# Patient Record
Sex: FEMALE | Race: WHITE | NOT HISPANIC OR LATINO | Employment: FULL TIME | URBAN - METROPOLITAN AREA
[De-identification: names, ages, dates, MRNs, and addresses within clinical notes are randomized per-mention and may not be internally consistent; named-entity substitution may affect disease eponyms.]

---

## 2019-07-17 ENCOUNTER — ANESTHESIA (OUTPATIENT)
Dept: SURGERY | Facility: HOSPITAL | Age: 39
DRG: 331 | End: 2019-07-17
Payer: COMMERCIAL

## 2019-07-17 ENCOUNTER — ANESTHESIA EVENT (OUTPATIENT)
Dept: SURGERY | Facility: HOSPITAL | Age: 39
DRG: 331 | End: 2019-07-17
Payer: COMMERCIAL

## 2019-07-17 ENCOUNTER — HOSPITAL ENCOUNTER (INPATIENT)
Facility: HOSPITAL | Age: 39
LOS: 6 days | Discharge: HOME | DRG: 331 | End: 2019-07-23
Attending: EMERGENCY MEDICINE | Admitting: COLON & RECTAL SURGERY
Payer: COMMERCIAL

## 2019-07-17 DIAGNOSIS — K45.8 INTERNAL HERNIA: ICD-10-CM

## 2019-07-17 DIAGNOSIS — K56.609 SMALL BOWEL OBSTRUCTION: Primary | ICD-10-CM

## 2019-07-17 DIAGNOSIS — Z01.89 ENCOUNTER FOR IMAGING STUDY TO CONFIRM NASOGASTRIC (NG) TUBE PLACEMENT: ICD-10-CM

## 2019-07-17 DIAGNOSIS — K56.2 SMALL BOWEL VOLVULUS: ICD-10-CM

## 2019-07-17 LAB
ALBUMIN SERPL BCP-MCNC: 4.1 G/DL (ref 3.5–5.2)
ALP SERPL-CCNC: 83 U/L (ref 55–135)
ALT SERPL W/O P-5'-P-CCNC: 13 U/L (ref 10–44)
ANION GAP SERPL CALC-SCNC: 11 MMOL/L (ref 8–16)
ANISOCYTOSIS BLD QL SMEAR: SLIGHT
AST SERPL-CCNC: 23 U/L (ref 10–40)
B-HCG UR QL: NEGATIVE
BACTERIA #/AREA URNS HPF: ABNORMAL /HPF
BASOPHILS # BLD AUTO: 0.02 K/UL (ref 0–0.2)
BASOPHILS NFR BLD: 0.2 % (ref 0–1.9)
BILIRUB SERPL-MCNC: 0.6 MG/DL (ref 0.1–1)
BILIRUB UR QL STRIP: NEGATIVE
BUN SERPL-MCNC: 12 MG/DL (ref 6–20)
CALCIUM SERPL-MCNC: 9.7 MG/DL (ref 8.7–10.5)
CHLORIDE SERPL-SCNC: 105 MMOL/L (ref 95–110)
CLARITY UR: ABNORMAL
CO2 SERPL-SCNC: 23 MMOL/L (ref 23–29)
COLOR UR: YELLOW
CREAT SERPL-MCNC: 0.8 MG/DL (ref 0.5–1.4)
DIFFERENTIAL METHOD: ABNORMAL
EOSINOPHIL # BLD AUTO: 0.1 K/UL (ref 0–0.5)
EOSINOPHIL NFR BLD: 1.3 % (ref 0–8)
ERYTHROCYTE [DISTWIDTH] IN BLOOD BY AUTOMATED COUNT: 16.6 % (ref 11.5–14.5)
EST. GFR  (AFRICAN AMERICAN): >60 ML/MIN/1.73 M^2
EST. GFR  (NON AFRICAN AMERICAN): >60 ML/MIN/1.73 M^2
GLUCOSE SERPL-MCNC: 87 MG/DL (ref 70–110)
GLUCOSE UR QL STRIP: NEGATIVE
HCT VFR BLD AUTO: 28.7 % (ref 37–48.5)
HGB BLD-MCNC: 8.4 G/DL (ref 12–16)
HGB UR QL STRIP: NEGATIVE
HIV 1+2 AB+HIV1 P24 AG SERPL QL IA: NEGATIVE
HYPOCHROMIA BLD QL SMEAR: ABNORMAL
KETONES UR QL STRIP: NEGATIVE
LEUKOCYTE ESTERASE UR QL STRIP: NEGATIVE
LIPASE SERPL-CCNC: 27 U/L (ref 4–60)
LYMPHOCYTES # BLD AUTO: 2.2 K/UL (ref 1–4.8)
LYMPHOCYTES NFR BLD: 23.5 % (ref 18–48)
MCH RBC QN AUTO: 19.8 PG (ref 27–31)
MCHC RBC AUTO-ENTMCNC: 29.3 G/DL (ref 32–36)
MCV RBC AUTO: 68 FL (ref 82–98)
MICROSCOPIC COMMENT: ABNORMAL
MONOCYTES # BLD AUTO: 0.7 K/UL (ref 0.3–1)
MONOCYTES NFR BLD: 7.3 % (ref 4–15)
NEUTROPHILS # BLD AUTO: 6.4 K/UL (ref 1.8–7.7)
NEUTROPHILS NFR BLD: 68.1 % (ref 38–73)
NITRITE UR QL STRIP: POSITIVE
PH UR STRIP: 6 [PH] (ref 5–8)
PLATELET # BLD AUTO: 356 K/UL (ref 150–350)
PLATELET BLD QL SMEAR: ABNORMAL
PMV BLD AUTO: 8.8 FL (ref 9.2–12.9)
POIKILOCYTOSIS BLD QL SMEAR: SLIGHT
POTASSIUM SERPL-SCNC: 4 MMOL/L (ref 3.5–5.1)
PROT SERPL-MCNC: 7.3 G/DL (ref 6–8.4)
PROT UR QL STRIP: ABNORMAL
RBC # BLD AUTO: 4.24 M/UL (ref 4–5.4)
RBC #/AREA URNS HPF: 5 /HPF (ref 0–4)
SODIUM SERPL-SCNC: 139 MMOL/L (ref 136–145)
SP GR UR STRIP: >=1.03 (ref 1–1.03)
SPHEROCYTES BLD QL SMEAR: ABNORMAL
SQUAMOUS #/AREA URNS HPF: 10 /HPF
STOMATOCYTES BLD QL SMEAR: PRESENT
TARGETS BLD QL SMEAR: ABNORMAL
URN SPEC COLLECT METH UR: ABNORMAL
UROBILINOGEN UR STRIP-ACNC: 1 EU/DL
WBC # BLD AUTO: 9.44 K/UL (ref 3.9–12.7)
WBC #/AREA URNS HPF: 25 /HPF (ref 0–5)

## 2019-07-17 PROCEDURE — 83690 ASSAY OF LIPASE: CPT

## 2019-07-17 PROCEDURE — 44050 PR REDUCE VOLVULUS,INTUSS,INTERN HERNIA: ICD-10-PCS | Mod: ,,, | Performed by: COLON & RECTAL SURGERY

## 2019-07-17 PROCEDURE — 99900035 HC TECH TIME PER 15 MIN (STAT)

## 2019-07-17 PROCEDURE — 81000 URINALYSIS NONAUTO W/SCOPE: CPT

## 2019-07-17 PROCEDURE — 81025 URINE PREGNANCY TEST: CPT

## 2019-07-17 PROCEDURE — S0028 INJECTION, FAMOTIDINE, 20 MG: HCPCS | Performed by: COLON & RECTAL SURGERY

## 2019-07-17 PROCEDURE — 87077 CULTURE AEROBIC IDENTIFY: CPT

## 2019-07-17 PROCEDURE — 96361 HYDRATE IV INFUSION ADD-ON: CPT

## 2019-07-17 PROCEDURE — 25000003 PHARM REV CODE 250: Performed by: COLON & RECTAL SURGERY

## 2019-07-17 PROCEDURE — 86703 HIV-1/HIV-2 1 RESULT ANTBDY: CPT

## 2019-07-17 PROCEDURE — 37000009 HC ANESTHESIA EA ADD 15 MINS: Performed by: COLON & RECTAL SURGERY

## 2019-07-17 PROCEDURE — 99285 EMERGENCY DEPT VISIT HI MDM: CPT | Mod: 25

## 2019-07-17 PROCEDURE — 71000033 HC RECOVERY, INTIAL HOUR: Performed by: COLON & RECTAL SURGERY

## 2019-07-17 PROCEDURE — 99222 1ST HOSP IP/OBS MODERATE 55: CPT | Mod: 57,,, | Performed by: PHYSICIAN ASSISTANT

## 2019-07-17 PROCEDURE — 44050 REDUCE BOWEL OBSTRUCTION: CPT | Mod: ,,, | Performed by: COLON & RECTAL SURGERY

## 2019-07-17 PROCEDURE — 36415 COLL VENOUS BLD VENIPUNCTURE: CPT

## 2019-07-17 PROCEDURE — 63600175 PHARM REV CODE 636 W HCPCS: Performed by: NURSE ANESTHETIST, CERTIFIED REGISTERED

## 2019-07-17 PROCEDURE — 25000003 PHARM REV CODE 250: Performed by: NURSE ANESTHETIST, CERTIFIED REGISTERED

## 2019-07-17 PROCEDURE — 11000001 HC ACUTE MED/SURG PRIVATE ROOM

## 2019-07-17 PROCEDURE — 63600175 PHARM REV CODE 636 W HCPCS: Performed by: EMERGENCY MEDICINE

## 2019-07-17 PROCEDURE — 25500020 PHARM REV CODE 255: Performed by: EMERGENCY MEDICINE

## 2019-07-17 PROCEDURE — 25000003 PHARM REV CODE 250: Performed by: EMERGENCY MEDICINE

## 2019-07-17 PROCEDURE — 87186 SC STD MICRODIL/AGAR DIL: CPT

## 2019-07-17 PROCEDURE — 96375 TX/PRO/DX INJ NEW DRUG ADDON: CPT

## 2019-07-17 PROCEDURE — 96365 THER/PROPH/DIAG IV INF INIT: CPT

## 2019-07-17 PROCEDURE — 94770 HC EXHALED C02 TEST: CPT

## 2019-07-17 PROCEDURE — 99222 PR INITIAL HOSPITAL CARE,LEVL II: ICD-10-PCS | Mod: 57,,, | Performed by: PHYSICIAN ASSISTANT

## 2019-07-17 PROCEDURE — 63600175 PHARM REV CODE 636 W HCPCS: Performed by: COLON & RECTAL SURGERY

## 2019-07-17 PROCEDURE — 37000008 HC ANESTHESIA 1ST 15 MINUTES: Performed by: COLON & RECTAL SURGERY

## 2019-07-17 PROCEDURE — S0030 INJECTION, METRONIDAZOLE: HCPCS | Performed by: NURSE ANESTHETIST, CERTIFIED REGISTERED

## 2019-07-17 PROCEDURE — 85025 COMPLETE CBC W/AUTO DIFF WBC: CPT

## 2019-07-17 PROCEDURE — C9290 INJ, BUPIVACAINE LIPOSOME: HCPCS | Performed by: COLON & RECTAL SURGERY

## 2019-07-17 PROCEDURE — 87088 URINE BACTERIA CULTURE: CPT

## 2019-07-17 PROCEDURE — 27201423 OPTIME MED/SURG SUP & DEVICES STERILE SUPPLY: Performed by: COLON & RECTAL SURGERY

## 2019-07-17 PROCEDURE — 80053 COMPREHEN METABOLIC PANEL: CPT

## 2019-07-17 PROCEDURE — 36000706: Performed by: COLON & RECTAL SURGERY

## 2019-07-17 PROCEDURE — 87086 URINE CULTURE/COLONY COUNT: CPT

## 2019-07-17 PROCEDURE — 36000707: Performed by: COLON & RECTAL SURGERY

## 2019-07-17 RX ORDER — BUSPIRONE HYDROCHLORIDE 5 MG/1
5 TABLET ORAL 2 TIMES DAILY
COMMUNITY

## 2019-07-17 RX ORDER — LIDOCAINE HYDROCHLORIDE 10 MG/ML
INJECTION, SOLUTION EPIDURAL; INFILTRATION; INTRACAUDAL; PERINEURAL
Status: DISCONTINUED | OUTPATIENT
Start: 2019-07-17 | End: 2019-07-17

## 2019-07-17 RX ORDER — FENTANYL CITRATE 50 UG/ML
INJECTION, SOLUTION INTRAMUSCULAR; INTRAVENOUS
Status: DISCONTINUED | OUTPATIENT
Start: 2019-07-17 | End: 2019-07-17

## 2019-07-17 RX ORDER — KETOROLAC TROMETHAMINE 30 MG/ML
15 INJECTION, SOLUTION INTRAMUSCULAR; INTRAVENOUS EVERY 8 HOURS PRN
Status: DISCONTINUED | OUTPATIENT
Start: 2019-07-17 | End: 2019-07-17

## 2019-07-17 RX ORDER — MORPHINE SULFATE 4 MG/ML
4 INJECTION, SOLUTION INTRAMUSCULAR; INTRAVENOUS
Status: DISCONTINUED | OUTPATIENT
Start: 2019-07-17 | End: 2019-07-17

## 2019-07-17 RX ORDER — ACETAMINOPHEN 10 MG/ML
1000 INJECTION, SOLUTION INTRAVENOUS EVERY 8 HOURS
Status: COMPLETED | OUTPATIENT
Start: 2019-07-17 | End: 2019-07-18

## 2019-07-17 RX ORDER — OXYCODONE HYDROCHLORIDE 5 MG/1
5 TABLET ORAL
Status: DISCONTINUED | OUTPATIENT
Start: 2019-07-17 | End: 2019-07-17

## 2019-07-17 RX ORDER — ONDANSETRON 2 MG/ML
INJECTION INTRAMUSCULAR; INTRAVENOUS
Status: DISCONTINUED | OUTPATIENT
Start: 2019-07-17 | End: 2019-07-17

## 2019-07-17 RX ORDER — ROCURONIUM BROMIDE 10 MG/ML
INJECTION, SOLUTION INTRAVENOUS
Status: DISCONTINUED | OUTPATIENT
Start: 2019-07-17 | End: 2019-07-17

## 2019-07-17 RX ORDER — MORPHINE SULFATE 4 MG/ML
4 INJECTION, SOLUTION INTRAMUSCULAR; INTRAVENOUS
Status: COMPLETED | OUTPATIENT
Start: 2019-07-17 | End: 2019-07-17

## 2019-07-17 RX ORDER — SODIUM CHLORIDE 0.9 % (FLUSH) 0.9 %
10 SYRINGE (ML) INJECTION
Status: DISCONTINUED | OUTPATIENT
Start: 2019-07-17 | End: 2019-07-23 | Stop reason: HOSPADM

## 2019-07-17 RX ORDER — FAMOTIDINE 20 MG/50ML
20 INJECTION, SOLUTION INTRAVENOUS 2 TIMES DAILY
Status: DISCONTINUED | OUTPATIENT
Start: 2019-07-17 | End: 2019-07-22

## 2019-07-17 RX ORDER — PANTOPRAZOLE SODIUM 40 MG/10ML
40 INJECTION, POWDER, LYOPHILIZED, FOR SOLUTION INTRAVENOUS
Status: DISCONTINUED | OUTPATIENT
Start: 2019-07-18 | End: 2019-07-17

## 2019-07-17 RX ORDER — MORPHINE SULFATE 1 MG/ML
INJECTION INTRAVENOUS CONTINUOUS
Status: DISCONTINUED | OUTPATIENT
Start: 2019-07-17 | End: 2019-07-20

## 2019-07-17 RX ORDER — SODIUM CHLORIDE, SODIUM LACTATE, POTASSIUM CHLORIDE, CALCIUM CHLORIDE 600; 310; 30; 20 MG/100ML; MG/100ML; MG/100ML; MG/100ML
INJECTION, SOLUTION INTRAVENOUS CONTINUOUS PRN
Status: DISCONTINUED | OUTPATIENT
Start: 2019-07-17 | End: 2019-07-17

## 2019-07-17 RX ORDER — HYDROMORPHONE HYDROCHLORIDE 2 MG/ML
0.2 INJECTION, SOLUTION INTRAMUSCULAR; INTRAVENOUS; SUBCUTANEOUS EVERY 5 MIN PRN
Status: DISCONTINUED | OUTPATIENT
Start: 2019-07-17 | End: 2019-07-17

## 2019-07-17 RX ORDER — METRONIDAZOLE 500 MG/100ML
INJECTION, SOLUTION INTRAVENOUS
Status: DISCONTINUED | OUTPATIENT
Start: 2019-07-17 | End: 2019-07-17

## 2019-07-17 RX ORDER — SODIUM CHLORIDE 9 MG/ML
1000 INJECTION, SOLUTION INTRAVENOUS
Status: DISCONTINUED | OUTPATIENT
Start: 2019-07-17 | End: 2019-07-17

## 2019-07-17 RX ORDER — SODIUM CHLORIDE 9 MG/ML
1000 INJECTION, SOLUTION INTRAVENOUS
Status: COMPLETED | OUTPATIENT
Start: 2019-07-17 | End: 2019-07-17

## 2019-07-17 RX ORDER — GLYCOPYRROLATE 0.2 MG/ML
INJECTION INTRAMUSCULAR; INTRAVENOUS
Status: DISCONTINUED | OUTPATIENT
Start: 2019-07-17 | End: 2019-07-17

## 2019-07-17 RX ORDER — ONDANSETRON 2 MG/ML
4 INJECTION INTRAMUSCULAR; INTRAVENOUS EVERY 6 HOURS PRN
Status: DISCONTINUED | OUTPATIENT
Start: 2019-07-17 | End: 2019-07-17

## 2019-07-17 RX ORDER — ONDANSETRON 2 MG/ML
4 INJECTION INTRAMUSCULAR; INTRAVENOUS
Status: DISCONTINUED | OUTPATIENT
Start: 2019-07-17 | End: 2019-07-17

## 2019-07-17 RX ORDER — DIPHENHYDRAMINE HYDROCHLORIDE 50 MG/ML
12.5 INJECTION INTRAMUSCULAR; INTRAVENOUS EVERY 6 HOURS PRN
Status: DISCONTINUED | OUTPATIENT
Start: 2019-07-17 | End: 2019-07-23 | Stop reason: HOSPADM

## 2019-07-17 RX ORDER — MORPHINE SULFATE 4 MG/ML
4 INJECTION, SOLUTION INTRAMUSCULAR; INTRAVENOUS EVERY 4 HOURS PRN
Status: DISCONTINUED | OUTPATIENT
Start: 2019-07-17 | End: 2019-07-20

## 2019-07-17 RX ORDER — SUCCINYLCHOLINE CHLORIDE 20 MG/ML
INJECTION INTRAMUSCULAR; INTRAVENOUS
Status: DISCONTINUED | OUTPATIENT
Start: 2019-07-17 | End: 2019-07-17

## 2019-07-17 RX ORDER — KETOROLAC TROMETHAMINE 30 MG/ML
INJECTION, SOLUTION INTRAMUSCULAR; INTRAVENOUS
Status: DISCONTINUED | OUTPATIENT
Start: 2019-07-17 | End: 2019-07-17

## 2019-07-17 RX ORDER — BUPIVACAINE HYDROCHLORIDE 2.5 MG/ML
INJECTION, SOLUTION EPIDURAL; INFILTRATION; INTRACAUDAL
Status: DISCONTINUED | OUTPATIENT
Start: 2019-07-17 | End: 2019-07-17 | Stop reason: HOSPADM

## 2019-07-17 RX ORDER — SODIUM CHLORIDE, SODIUM LACTATE, POTASSIUM CHLORIDE, CALCIUM CHLORIDE 600; 310; 30; 20 MG/100ML; MG/100ML; MG/100ML; MG/100ML
INJECTION, SOLUTION INTRAVENOUS CONTINUOUS
Status: DISCONTINUED | OUTPATIENT
Start: 2019-07-17 | End: 2019-07-20

## 2019-07-17 RX ORDER — PROPOFOL 10 MG/ML
VIAL (ML) INTRAVENOUS
Status: DISCONTINUED | OUTPATIENT
Start: 2019-07-17 | End: 2019-07-17

## 2019-07-17 RX ORDER — METRONIDAZOLE 500 MG/100ML
INJECTION, SOLUTION INTRAVENOUS
Status: COMPLETED
Start: 2019-07-17 | End: 2019-07-17

## 2019-07-17 RX ORDER — CHLORHEXIDINE GLUCONATE ORAL RINSE 1.2 MG/ML
10 SOLUTION DENTAL 2 TIMES DAILY
Status: COMPLETED | OUTPATIENT
Start: 2019-07-17 | End: 2019-07-22

## 2019-07-17 RX ORDER — ENOXAPARIN SODIUM 100 MG/ML
40 INJECTION SUBCUTANEOUS EVERY 24 HOURS
Status: DISCONTINUED | OUTPATIENT
Start: 2019-07-18 | End: 2019-07-23 | Stop reason: HOSPADM

## 2019-07-17 RX ORDER — NEOSTIGMINE METHYLSULFATE 1 MG/ML
INJECTION, SOLUTION INTRAVENOUS
Status: DISCONTINUED | OUTPATIENT
Start: 2019-07-17 | End: 2019-07-17

## 2019-07-17 RX ORDER — ONDANSETRON 2 MG/ML
4 INJECTION INTRAMUSCULAR; INTRAVENOUS EVERY 12 HOURS PRN
Status: DISCONTINUED | OUTPATIENT
Start: 2019-07-17 | End: 2019-07-23 | Stop reason: HOSPADM

## 2019-07-17 RX ORDER — ONDANSETRON 2 MG/ML
4 INJECTION INTRAMUSCULAR; INTRAVENOUS
Status: COMPLETED | OUTPATIENT
Start: 2019-07-17 | End: 2019-07-17

## 2019-07-17 RX ORDER — DEXAMETHASONE SODIUM PHOSPHATE 4 MG/ML
INJECTION, SOLUTION INTRA-ARTICULAR; INTRALESIONAL; INTRAMUSCULAR; INTRAVENOUS; SOFT TISSUE
Status: DISCONTINUED | OUTPATIENT
Start: 2019-07-17 | End: 2019-07-17

## 2019-07-17 RX ORDER — NALOXONE HCL 0.4 MG/ML
0.02 VIAL (ML) INJECTION
Status: DISCONTINUED | OUTPATIENT
Start: 2019-07-17 | End: 2019-07-23 | Stop reason: HOSPADM

## 2019-07-17 RX ADMIN — MORPHINE SULFATE: 1 INJECTION INTRAVENOUS at 06:07

## 2019-07-17 RX ADMIN — SODIUM CHLORIDE 1000 ML: 0.9 INJECTION, SOLUTION INTRAVENOUS at 05:07

## 2019-07-17 RX ADMIN — METRONIDAZOLE 500 MG: 500 SOLUTION INTRAVENOUS at 04:07

## 2019-07-17 RX ADMIN — IOHEXOL 75 ML: 350 INJECTION, SOLUTION INTRAVENOUS at 02:07

## 2019-07-17 RX ADMIN — FENTANYL CITRATE 100 MCG: 50 INJECTION, SOLUTION INTRAMUSCULAR; INTRAVENOUS at 04:07

## 2019-07-17 RX ADMIN — MORPHINE SULFATE 4 MG: 4 INJECTION, SOLUTION INTRAMUSCULAR; INTRAVENOUS at 05:07

## 2019-07-17 RX ADMIN — MORPHINE SULFATE 4 MG: 4 INJECTION, SOLUTION INTRAMUSCULAR; INTRAVENOUS at 01:07

## 2019-07-17 RX ADMIN — SODIUM CHLORIDE, SODIUM LACTATE, POTASSIUM CHLORIDE, AND CALCIUM CHLORIDE: .6; .31; .03; .02 INJECTION, SOLUTION INTRAVENOUS at 08:07

## 2019-07-17 RX ADMIN — ONDANSETRON 4 MG: 2 INJECTION INTRAMUSCULAR; INTRAVENOUS at 06:07

## 2019-07-17 RX ADMIN — ONDANSETRON 4 MG: 2 INJECTION INTRAMUSCULAR; INTRAVENOUS at 05:07

## 2019-07-17 RX ADMIN — ONDANSETRON 4 MG: 2 INJECTION INTRAMUSCULAR; INTRAVENOUS at 01:07

## 2019-07-17 RX ADMIN — ROBINUL 0.6 MG: 0.2 INJECTION INTRAMUSCULAR; INTRAVENOUS at 05:07

## 2019-07-17 RX ADMIN — SODIUM CHLORIDE, SODIUM LACTATE, POTASSIUM CHLORIDE, AND CALCIUM CHLORIDE: .6; .31; .03; .02 INJECTION, SOLUTION INTRAVENOUS at 03:07

## 2019-07-17 RX ADMIN — LIDOCAINE HYDROCHLORIDE 50 MG: 10 INJECTION, SOLUTION EPIDURAL; INFILTRATION; INTRACAUDAL; PERINEURAL at 04:07

## 2019-07-17 RX ADMIN — SODIUM CHLORIDE 1000 ML: 0.9 INJECTION, SOLUTION INTRAVENOUS at 01:07

## 2019-07-17 RX ADMIN — SUCCINYLCHOLINE CHLORIDE 120 MG: 20 INJECTION, SOLUTION INTRAMUSCULAR; INTRAVENOUS at 04:07

## 2019-07-17 RX ADMIN — MORPHINE SULFATE 4 MG: 4 INJECTION, SOLUTION INTRAMUSCULAR; INTRAVENOUS at 10:07

## 2019-07-17 RX ADMIN — DEXAMETHASONE SODIUM PHOSPHATE 4 MG: 4 INJECTION, SOLUTION INTRA-ARTICULAR; INTRALESIONAL; INTRAMUSCULAR; INTRAVENOUS; SOFT TISSUE at 05:07

## 2019-07-17 RX ADMIN — CEFTRIAXONE 1 G: 1 INJECTION, SOLUTION INTRAVENOUS at 03:07

## 2019-07-17 RX ADMIN — ONDANSETRON 4 MG: 2 INJECTION INTRAMUSCULAR; INTRAVENOUS at 02:07

## 2019-07-17 RX ADMIN — NEOSTIGMINE METHYLSULFATE 4 MG: 1 INJECTION INTRAVENOUS at 05:07

## 2019-07-17 RX ADMIN — ONDANSETRON 4 MG: 2 INJECTION, SOLUTION INTRAMUSCULAR; INTRAVENOUS at 05:07

## 2019-07-17 RX ADMIN — CEFTRIAXONE 2 G: 1 INJECTION, SOLUTION INTRAVENOUS at 04:07

## 2019-07-17 RX ADMIN — KETOROLAC TROMETHAMINE 30 MG: 30 INJECTION, SOLUTION INTRAMUSCULAR; INTRAVENOUS at 05:07

## 2019-07-17 RX ADMIN — CHLORHEXIDINE GLUCONATE 0.12% ORAL RINSE 10 ML: 1.2 LIQUID ORAL at 08:07

## 2019-07-17 RX ADMIN — ACETAMINOPHEN 1000 MG: 10 INJECTION, SOLUTION INTRAVENOUS at 09:07

## 2019-07-17 RX ADMIN — ROCURONIUM BROMIDE 35 MG: 10 INJECTION, SOLUTION INTRAVENOUS at 04:07

## 2019-07-17 RX ADMIN — SODIUM CHLORIDE, SODIUM LACTATE, POTASSIUM CHLORIDE, AND CALCIUM CHLORIDE: 600; 310; 30; 20 INJECTION, SOLUTION INTRAVENOUS at 04:07

## 2019-07-17 RX ADMIN — FAMOTIDINE 20 MG: 20 INJECTION, SOLUTION INTRAVENOUS at 08:07

## 2019-07-17 RX ADMIN — PROMETHAZINE HYDROCHLORIDE 6.25 MG: 25 INJECTION INTRAMUSCULAR; INTRAVENOUS at 11:07

## 2019-07-17 RX ADMIN — ROCURONIUM BROMIDE 5 MG: 10 INJECTION, SOLUTION INTRAVENOUS at 04:07

## 2019-07-17 RX ADMIN — PROPOFOL 200 MG: 10 INJECTION, EMULSION INTRAVENOUS at 04:07

## 2019-07-17 RX ADMIN — BUPIVACAINE 266 MG: 13.3 INJECTION, SUSPENSION, LIPOSOMAL INFILTRATION at 04:07

## 2019-07-17 NOTE — PLAN OF CARE
CM met with patient at the bedside to assess for discharge needs.  Patient lives at home alone and is independent with all needs.  She does not anticipate any discharge needs at this time.  CM provided a transitional care folder, information on advanced directives, information on pharmacy bedside delivery, and discharge planning begins on admission with contact information for any needs/questions.    D/C Plan: Home  PCP: None  Preferred Pharmacy: [facility] [address] [place]  Discharge transportation: Family  My [facility]: Link sent  Pharmacy Bedside Delivery: Yes     07/17/19 0282   Discharge Assessment   Assessment Type Discharge Planning Assessment   Confirmed/corrected address and phone number on facesheet? Yes   Assessment information obtained from? Patient;Medical Record   Expected Length of Stay (days)   (TBD)   Communicated expected length of stay with patient/caregiver yes   Prior to hospitilization cognitive status: Alert/Oriented   Prior to hospitalization functional status: Independent   Current cognitive status: Alert/Oriented   Current Functional Status: Independent   Facility Arrived From: home   Lives With alone   Able to Return to Prior Arrangements yes   Is patient able to care for self after discharge? Yes   Who are your caregiver(s) and their phone number(s)? Mari Szymanski, mother [phone]   Patient's perception of discharge disposition home or selfcare   Readmission Within the Last 30 Days no previous admission in last 30 days   Patient currently being followed by outpatient case management? No   Patient currently receives any other outside agency services? No   Equipment Currently Used at Home none   Do you have any problems affording any of your prescribed medications? No   Is the patient taking medications as prescribed? yes   Does the patient have transportation home? Yes   Transportation Anticipated family or friend will provide   Dialysis Name and Scheduled days NA   Does the patient  receive services at the [facility]? No   Discharge Plan A Home   DME Needed Upon Discharge  none   Patient/Family in Agreement with Plan yes

## 2019-07-17 NOTE — ASSESSMENT & PLAN NOTE
S/p gastric bypass surgery  CT findings concerning for small bowel obstruction at the distal anastomosis, also concern for small bowel volvulus.   NPO, continue NG to LIWS, analgesics, IV hydration.   She will likely need laparoscopic vs open exploration today. This was discussed with the patient who voices understanding.

## 2019-07-17 NOTE — SUBJECTIVE & OBJECTIVE
No current facility-administered medications on file prior to encounter.      Current Outpatient Medications on File Prior to Encounter   Medication Sig    busPIRone (BUSPAR) 5 MG Tab Take 5 mg by mouth 2 (two) times daily.    eflornithine (VANIQA) 13.9 % cream Apply topically 2 (two) times daily.    naproxen (NAPROSYN) 375 MG tablet Take 1 tablet (375 mg total) by mouth 2 (two) times daily with meals.    [DISCONTINUED] desogestrel-ethinyl estradiol (DESOGEN) 0.15-30 mg-mcg per tablet Take 1 tablet by mouth Daily.       Review of patient's allergies indicates:  No Known Allergies    History reviewed. No pertinent past medical history.  Past Surgical History:   Procedure Laterality Date    CHOLECYSTECTOMY      GASTRIC BYPASS      tonsilectomy      TONSILLECTOMY       Family History     Problem Relation (Age of Onset)    Breast cancer Paternal Grandfather, Sister    Diabetes Maternal Grandmother    Hypertension Maternal Grandmother, Father        Tobacco Use    Smoking status: Never Smoker   Substance and Sexual Activity    Alcohol use: No    Drug use: No    Sexual activity: Yes     Review of Systems   Constitutional: Negative for activity change, chills and fever.   Respiratory: Negative for shortness of breath.    Cardiovascular: Negative for chest pain.   Gastrointestinal: Positive for abdominal distention, abdominal pain, constipation, nausea and vomiting.   Genitourinary: Negative for dysuria.   Musculoskeletal: Negative for myalgias.   Skin: Negative for wound.   Neurological: Negative for weakness.   Hematological: Does not bruise/bleed easily.   Psychiatric/Behavioral: The patient is not nervous/anxious.      Objective:     Vital Signs (Most Recent):  Temp: 98 °F (36.7 °C) (07/17/19 0731)  Pulse: (!) 52 (07/17/19 0731)  Resp: 15 (07/17/19 0731)  BP: 114/71 (07/17/19 0731)  SpO2: 100 % (07/17/19 0731) Vital Signs (24h Range):  Temp:  [97.3 °F (36.3 °C)-98 °F (36.7 °C)] 98 °F (36.7 °C)  Pulse:   [52-80] 52  Resp:  [15-18] 15  SpO2:  [98 %-100 %] 100 %  BP: (110-121)/(62-75) 114/71     Weight: 63 kg (138 lb 14.2 oz)  Body mass index is 21.75 kg/m².    Physical Exam   Constitutional: She is oriented to person, place, and time. She appears well-developed and well-nourished.   HENT:   Head: Normocephalic and atraumatic.   Eyes: EOM are normal.   Cardiovascular: Normal rate and regular rhythm.   Pulmonary/Chest: Effort normal. No respiratory distress.   Abdominal: She exhibits distension. There is tenderness (generalized ). No hernia.   Well healed laparoscopic incisions.   Musculoskeletal: Normal range of motion.   Neurological: She is alert and oriented to person, place, and time.   Skin: Skin is warm and dry.   Psychiatric: She has a normal mood and affect. Thought content normal.   Vitals reviewed.      Significant Labs:  CBC:   Recent Labs   Lab 07/17/19  0110   WBC 9.44   RBC 4.24   HGB 8.4*   HCT 28.7*   *   MCV 68*   MCH 19.8*   MCHC 29.3*     CMP:   Recent Labs   Lab 07/17/19  0110   GLU 87   CALCIUM 9.7   ALBUMIN 4.1   PROT 7.3      K 4.0   CO2 23      BUN 12   CREATININE 0.8   ALKPHOS 83   ALT 13   AST 23   BILITOT 0.6       Significant Diagnostics:  I have reviewed all pertinent imaging results/findings within the past 24 hours.

## 2019-07-17 NOTE — PLAN OF CARE
Problem: Adult Inpatient Plan of Care  Goal: Readiness for Transition of Care    Intervention: Mutually Develop Transition Plan     07/17/19 2352   Discharge Needs Assessment   Readmission Within the Last 30 Days no previous admission in last 30 days   Equipment Currently Used at Home none   Transportation Anticipated family or friend will provide   Social Work Plan   Patient/Family in Agreement with Plan yes   Living Environment   Able to Return to Prior Arrangements yes   (RETIRED) Current Health   Expected Length of Stay (days)   (TBD)   OTHER   Communicated expected length of stay with patient/caregiver yes   Is patient able to care for self after discharge? Yes   Who are your caregiver(s) and their phone number(s)? Mari Szymanski, mother [phone]   (RETIRED) Social Work Plan   Patient's perception of discharge disposition home or selfcare

## 2019-07-17 NOTE — ANESTHESIA POSTPROCEDURE EVALUATION
Anesthesia Post Evaluation    Patient: Janett Murrell    Procedure(s) Performed: Procedure(s) (LRB):  LAPAROTOMY, EXPLORATORY (N/A)  REPAIR, HERNIA (N/A)    Final Anesthesia Type: general  Patient location during evaluation: PACU  Patient participation: Yes- Able to Participate  Level of consciousness: awake and alert  Post-procedure vital signs: reviewed and stable  Pain management: adequate  Airway patency: patent  PONV status at discharge: No PONV  Anesthetic complications: no      Cardiovascular status: hemodynamically stable  Respiratory status: spontaneous ventilation  Hydration status: euvolemic  Follow-up not needed.          Vitals Value Taken Time   /59 7/17/2019  6:03 PM   Temp 36.6 °C (97.8 °F) 7/17/2019  6:00 PM   Pulse 107 7/17/2019  6:04 PM   Resp 14 7/17/2019  6:04 PM   SpO2 100 % 7/17/2019  6:04 PM   Vitals shown include unvalidated device data.      No case tracking events are documented in the log.      Pain/Joselyn Score: Pain Rating Prior to Med Admin: 6 (7/17/2019 10:36 AM)  Pain Rating Post Med Admin: 3 (7/17/2019 11:06 AM)

## 2019-07-17 NOTE — ANESTHESIA PREPROCEDURE EVALUATION
07/17/2019  Janett Murrell is a 39 y.o., female.    Anesthesia Evaluation    I have reviewed the Patient Summary Reports.    I have reviewed the Nursing Notes.   I have reviewed the Medications.     Review of Systems  Anesthesia Hx:  No problems with previous Anesthesia  History of prior surgery of interest to airway management or planning: Denies Family Hx of Anesthesia complications.   Denies Personal Hx of Anesthesia complications.   Social:  Non-Smoker    Hematology/Oncology:         -- Anemia:   Cardiovascular:  Cardiovascular Normal     Pulmonary:  Pulmonary Normal    Renal/:  Renal/ Normal     Musculoskeletal:  Musculoskeletal Normal    Neurological:  Neurology Normal    Endocrine:  Endocrine Normal    Psych:  Psychiatric Normal           Physical Exam  General:  Well nourished    Airway/Jaw/Neck:  Airway Findings: Mouth Opening: Normal Tongue: Normal  General Airway Assessment: Adult  Mallampati: II  TM Distance: Normal, at least 6 cm          Chest/Lungs:  Chest/Lungs Findings: Normal Respiratory Rate     Heart/Vascular:  Heart Findings: Rate: Normal             Anesthesia Plan  Type of Anesthesia, risks & benefits discussed:  Anesthesia Type:  general  Patient's Preference:   Intra-op Monitoring Plan: standard ASA monitors  Intra-op Monitoring Plan Comments:   Post Op Pain Control Plan:   Post Op Pain Control Plan Comments:   Induction:   IV  Beta Blocker:  Patient is not currently on a Beta-Blocker (No further documentation required).       Informed Consent: Patient understands risks and agrees with Anesthesia plan.  Questions answered. Anesthesia consent signed with patient.  ASA Score: 2  emergent   Day of Surgery Review of History & Physical:    H&P update referred to the surgeon.         Ready For Surgery From Anesthesia Perspective.     Patient Active Problem List   Diagnosis     History of vaginal bleeding    Small bowel obstruction         Results for PRINCECATHI JASSO (MRN [id number]) as of 7/17/2019 15:43   Ref. Range 7/17/2019 01:10   WBC Latest Ref Range: 3.90 - 12.70 K/uL 9.44   RBC Latest Ref Range: 4.00 - 5.40 M/uL 4.24   Hemoglobin Latest Ref Range: 12.0 - 16.0 g/dL 8.4 (L)   Hematocrit Latest Ref Range: 37.0 - 48.5 % 28.7 (L)   MCV Latest Ref Range: 82 - 98 fL 68 (L)   MCH Latest Ref Range: 27.0 - 31.0 pg 19.8 (L)   MCHC Latest Ref Range: 32.0 - 36.0 g/dL 29.3 (L)   RDW Latest Ref Range: 11.5 - 14.5 % 16.6 (H)   Platelets Latest Ref Range: 150 - 350 K/uL 356 (H)   MPV Latest Ref Range: 9.2 - 12.9 fL 8.8 (L)   Platelet Estimate Unknown Appears normal   Gran% Latest Ref Range: 38.0 - 73.0 % 68.1   Gran # (ANC) Latest Ref Range: 1.8 - 7.7 K/uL 6.4   Lymph% Latest Ref Range: 18.0 - 48.0 % 23.5   Lymph # Latest Ref Range: 1.0 - 4.8 K/uL 2.2   Mono% Latest Ref Range: 4.0 - 15.0 % 7.3   Mono # Latest Ref Range: 0.3 - 1.0 K/uL 0.7   Eosinophil% Latest Ref Range: 0.0 - 8.0 % 1.3   Eos # Latest Ref Range: 0.0 - 0.5 K/uL 0.1   Basophil% Latest Ref Range: 0.0 - 1.9 % 0.2   Baso # Latest Ref Range: 0.00 - 0.20 K/uL 0.02   Aniso Unknown Slight   Poik Unknown Slight   Hypo Unknown Occasional   Spherocytes Unknown Occasional   Stomatocytes Unknown Present   Target Cells Unknown Occasional   Differential Method Unknown Automated   Sodium Latest Ref Range: 136 - 145 mmol/L 139   Potassium Latest Ref Range: 3.5 - 5.1 mmol/L 4.0   Chloride Latest Ref Range: 95 - 110 mmol/L 105   CO2 Latest Ref Range: 23 - 29 mmol/L 23   Anion Gap Latest Ref Range: 8 - 16 mmol/L 11   BUN, Bld Latest Ref Range: 6 - 20 mg/dL 12   Creatinine Latest Ref Range: 0.5 - 1.4 mg/dL 0.8   eGFR if non African American Latest Ref Range: >60 mL/min/1.73 m^2 >60   eGFR if  Latest Ref Range: >60 mL/min/1.73 m^2 >60   Glucose Latest Ref Range: 70 - 110 mg/dL 87   Calcium Latest Ref Range: 8.7 - 10.5 mg/dL  9.7   Alkaline Phosphatase Latest Ref Range: 55 - 135 U/L 83   PROTEIN TOTAL Latest Ref Range: 6.0 - 8.4 g/dL 7.3   Albumin Latest Ref Range: 3.5 - 5.2 g/dL 4.1   BILIRUBIN TOTAL Latest Ref Range: 0.1 - 1.0 mg/dL 0.6   AST Latest Ref Range: 10 - 40 U/L 23   ALT Latest Ref Range: 10 - 44 U/L 13   Lipase Latest Ref Range: 4 - 60 U/L 27   HIV 1/2 Ag/Ab Latest Ref Range: Negative  Negative

## 2019-07-17 NOTE — HPI
Janett Murrell is a 39 y.o. female who is about 6 years s/p gastric bypass surgery which was done in [place] by Dr Broussard. She presented to the ED last night c/o epigastric abdominal pain that began 4-5 days ago and gradually worsened. The pain radiates across her abdomen, and is associated with abdominal distention, constipation, nausea and vomiting. She reports occasional mild intermittent symptoms occurring over the last year which resolve quickly. A CT showed dilated small bowel with a transition in the lower abdomen at the distal anastomosis. Cannot rule out small bowel volvulus. General Surgery was called for admission. An NG was placed.

## 2019-07-17 NOTE — ED PROVIDER NOTES
SCRIBE #1 NOTE: I, Tiny Thornton, am scribing for, and in the presence of, Maranda Pearce Do, MD. I have scribed the entire note.      History      Chief Complaint   Patient presents with    Abdominal Pain     periumbilical pain radiating to back, n/v       Review of patient's allergies indicates:  No Known Allergies     HPI   HPI    7/17/2019, 1:04 AM   History obtained from the patient      History of Present Illness: Janett Murrell is a 39 y.o. female patient with a PSHx of Gastric Bypass (2013) who presents to the Emergency Department for abdominal pain which onset yesterday. Symptoms are constant and moderate in severity. Patient reports her pain suddenly coming on after eating broccoli yesterday and reports it worsening after having 2 glasses of wine last night. Patient describes her pain as sharp and radiating to her back. No mitigating or exacerbating factors reported. Associated sxs include emesis. Patient denies any fever, chills, diarrhea, constipation, dysuria, hematuria,  CP, SOB, and all other sxs at this time. Prior Tx includes Toradol and Aleve, with no improvement in sxs. Pt denies having a hx of pancreatitis. No further complaints or concerns at this time.       Arrival mode: Personal vehicle      PCP: Provider [facility]     Past Medical History:  Past medical history reviewed not relevant    Past Surgical History:  Past Surgical History:   Procedure Laterality Date    CHOLECYSTECTOMY      GASTRIC BYPASS      tonsilectomy      TONSILLECTOMY           Family History:  Family History   Problem Relation Age of Onset    Breast cancer Paternal Grandfather     Diabetes Maternal Grandmother     Hypertension Maternal Grandmother     Hypertension Father     Breast cancer Sister        Social History:  Social History     Tobacco Use    Smoking status: Never Smoker   Substance and Sexual Activity    Alcohol use: No    Drug use: No    Sexual activity: Yes       ROS   Review of Systems    Constitutional: Negative for chills and fever.   HENT: Negative for sore throat.    Respiratory: Negative for shortness of breath.    Cardiovascular: Negative for chest pain.   Gastrointestinal: Positive for abdominal pain and vomiting. Negative for constipation, diarrhea and nausea.   Genitourinary: Negative for dysuria and hematuria.   Musculoskeletal: Negative for back pain.   Skin: Negative for rash.   Neurological: Negative for weakness.   Hematological: Does not bruise/bleed easily.   All other systems reviewed and are negative.    Physical Exam      Initial Vitals [07/17/19 0038]   BP Pulse Resp Temp SpO2   118/62 68 16 98 °F (36.7 °C) 100 %      MAP       --          Physical Exam  Nursing Notes and Vital Signs Reviewed.  Constitutional: Patient is in no acute distress. Well-developed and well-nourished.  Head: Atraumatic. Normocephalic.  Eyes: PERRL. EOM intact. Conjunctivae are not pale. No scleral icterus.  ENT: Mucous membranes are moist. Oropharynx is clear and symmetric.    Neck: Supple. Full ROM. No lymphadenopathy.  Cardiovascular: Regular rate. Regular rhythm. No murmurs, rubs, or gallops. Distal pulses are 2+ and symmetric.  Pulmonary/Chest: No respiratory distress. Clear to auscultation bilaterally. No wheezing or rales.  Abdominal: Soft and non-distended.  There is bilateral lower abdominal tenderness.  No rebound, guarding, or rigidity. Good bowel sounds.  Genitourinary: No CVA tenderness  Musculoskeletal: Moves all extremities. No obvious deformities. No edema. No calf tenderness.  Skin: Warm and dry.  Neurological:  Alert, awake, and appropriate.  Normal speech.  No acute focal neurological deficits are appreciated.  Psychiatric: Normal affect. Good eye contact. Appropriate in content.    ED Course    Critical Care  Date/Time: 7/17/2019 5:05 AM  Performed by: Maranda Pearce Do, MD  Authorized by: Maranda Pearce Do, MD   Direct patient critical care time: 10 minutes  Additional history  "critical care time: 5 minutes  Ordering / reviewing critical care time: 5 minutes  Documentation critical care time: 5 minutes  Consulting other physicians critical care time: 10 minutes  Total critical care time (exclusive of procedural time) : 35 minutes  Critical care time was exclusive of separately billable procedures and treating other patients and teaching time.  Critical care was necessary to treat or prevent imminent or life-threatening deterioration of the following conditions: Small Bowel Obstruction; NG Tube.  Critical care was time spent personally by me on the following activities: blood draw for specimens, development of treatment plan with patient or surrogate, discussions with consultants, gastric intubation, interpretation of cardiac output measurements, evaluation of patient's response to treatment, examination of patient, obtaining history from patient or surrogate, ordering and performing treatments and interventions, ordering and review of laboratory studies, ordering and review of radiographic studies, pulse oximetry, re-evaluation of patient's condition and review of old charts.        ED Vital Signs:  Vitals:    07/17/19 0038   BP: 118/62   Pulse: 68   Resp: 16   Temp: 98 °F (36.7 °C)   TempSrc: Oral   SpO2: 100%   Weight: 63 kg (138 lb 14.2 oz)   Height: 5' 7" (1.702 m)       Abnormal Lab Results:  Labs Reviewed   CBC W/ AUTO DIFFERENTIAL - Abnormal; Notable for the following components:       Result Value    Hemoglobin 8.4 (*)     Hematocrit 28.7 (*)     Mean Corpuscular Volume 68 (*)     Mean Corpuscular Hemoglobin 19.8 (*)     Mean Corpuscular Hemoglobin Conc 29.3 (*)     RDW 16.6 (*)     Platelets 356 (*)     MPV 8.8 (*)     All other components within normal limits   URINALYSIS, REFLEX TO URINE CULTURE - Abnormal; Notable for the following components:    Appearance, UA Hazy (*)     Specific Gravity, UA >=1.030 (*)     Protein, UA Trace (*)     Nitrite, UA Positive (*)     All other " components within normal limits    Narrative:     Preferred Collection Type->Urine, Clean Catch   URINALYSIS MICROSCOPIC - Abnormal; Notable for the following components:    RBC, UA 5 (*)     WBC, UA 25 (*)     Bacteria Many (*)     All other components within normal limits    Narrative:     Preferred Collection Type->Urine, Clean Catch   CULTURE, URINE   HIV 1 / 2 ANTIBODY   COMPREHENSIVE METABOLIC PANEL   LIPASE   PREGNANCY TEST, URINE RAPID        All Lab Results:  Results for orders placed or performed during the hospital encounter of 07/17/19   HIV 1/2 Ag/Ab (4th Gen)   Result Value Ref Range    HIV 1/2 Ag/Ab Negative Negative   CBC W/ AUTO DIFFERENTIAL   Result Value Ref Range    WBC 9.44 3.90 - 12.70 K/uL    RBC 4.24 4.00 - 5.40 M/uL    Hemoglobin 8.4 (L) 12.0 - 16.0 g/dL    Hematocrit 28.7 (L) 37.0 - 48.5 %    Mean Corpuscular Volume 68 (L) 82 - 98 fL    Mean Corpuscular Hemoglobin 19.8 (L) 27.0 - 31.0 pg    Mean Corpuscular Hemoglobin Conc 29.3 (L) 32.0 - 36.0 g/dL    RDW 16.6 (H) 11.5 - 14.5 %    Platelets 356 (H) 150 - 350 K/uL    MPV 8.8 (L) 9.2 - 12.9 fL    Gran # (ANC) 6.4 1.8 - 7.7 K/uL    Lymph # 2.2 1.0 - 4.8 K/uL    Mono # 0.7 0.3 - 1.0 K/uL    Eos # 0.1 0.0 - 0.5 K/uL    Baso # 0.02 0.00 - 0.20 K/uL    Gran% 68.1 38.0 - 73.0 %    Lymph% 23.5 18.0 - 48.0 %    Mono% 7.3 4.0 - 15.0 %    Eosinophil% 1.3 0.0 - 8.0 %    Basophil% 0.2 0.0 - 1.9 %    Platelet Estimate Appears normal     Aniso Slight     Poik Slight     Hypo Occasional     Target Cells Occasional     Stomatocytes Present     Spherocytes Occasional     Differential Method Automated    Comp. Metabolic Panel   Result Value Ref Range    Sodium 139 136 - 145 mmol/L    Potassium 4.0 3.5 - 5.1 mmol/L    Chloride 105 95 - 110 mmol/L    CO2 23 23 - 29 mmol/L    Glucose 87 70 - 110 mg/dL    BUN, Bld 12 6 - 20 mg/dL    Creatinine 0.8 0.5 - 1.4 mg/dL    Calcium 9.7 8.7 - 10.5 mg/dL    Total Protein 7.3 6.0 - 8.4 g/dL    Albumin 4.1 3.5 - 5.2 g/dL     Total Bilirubin 0.6 0.1 - 1.0 mg/dL    Alkaline Phosphatase 83 55 - 135 U/L    AST 23 10 - 40 U/L    ALT 13 10 - 44 U/L    Anion Gap 11 8 - 16 mmol/L    eGFR if African American >60 >60 mL/min/1.73 m^2    eGFR if non African American >60 >60 mL/min/1.73 m^2   Lipase   Result Value Ref Range    Lipase 27 4 - 60 U/L   Urinalysis, Reflex to Urine Culture Urine, Clean Catch   Result Value Ref Range    Specimen UA Urine, Clean Catch     Color, UA Yellow Yellow, Straw, Laura    Appearance, UA Hazy (A) Clear    pH, UA 6.0 5.0 - 8.0    Specific Gravity, UA >=1.030 (A) 1.005 - 1.030    Protein, UA Trace (A) Negative    Glucose, UA Negative Negative    Ketones, UA Negative Negative    Bilirubin (UA) Negative Negative    Occult Blood UA Negative Negative    Nitrite, UA Positive (A) Negative    Urobilinogen, UA 1.0 <2.0 EU/dL    Leukocytes, UA Negative Negative   Pregnancy, urine rapid   Result Value Ref Range    Preg Test, Ur Negative    Urinalysis Microscopic   Result Value Ref Range    RBC, UA 5 (H) 0 - 4 /hpf    WBC, UA 25 (H) 0 - 5 /hpf    Bacteria Many (A) None-Occ /hpf    Squam Epithel, UA 10 /hpf    Microscopic Comment SEE COMMENT          Imaging Results:  Imaging Results          CT Abdomen Pelvis With Contrast (In process)                4:06 AM: Per STAT radiology, pt's CT Abdomen and Pelvis results:   1. Small bowel obstruction with transition points in the left lower abdomen. No pneumatosis or free air.  2. Gastric and bowel postop changes. Moderate colonic stool/air which may be ileus/constipation.             The Emergency Provider reviewed the vital signs and test results, which are outlined above.    ED Discussion     4:28 AM: Discussed pt's case with Dr. Laurent (Colon and Rectal Surgery) who recommends placing NG Tube.    5:02 AM: Discussed case with Dr. Laurent (Colon and Rectal Surgery). Dr. Laurent agrees with current care and management of pt and accepts admission. Will place NGT  Admitting Service: General  Surgery  Admitting Physician: Dr. Laurent  Admit to: Surgery    5:02 AM: Re-evaluated pt. I have discussed test results, shared treatment plan, and the need for admission with patient and family at bedside. Pt and family express understanding at this time and agree with all information. All questions answered. Pt and family have no further questions or concerns at this time. Pt is ready for admit.      ED Medication(s):  Medications   0.9%  NaCl infusion (has no administration in time range)   0.9%  NaCl infusion (has no administration in time range)   morphine injection 4 mg (has no administration in time range)   ondansetron injection 4 mg (has no administration in time range)   sodium chloride 0.9% bolus 1,000 mL (0 mLs Intravenous Stopped 7/17/19 0322)   ondansetron injection 4 mg (4 mg Intravenous Given 7/17/19 0123)   morphine injection 4 mg (4 mg Intravenous Given 7/17/19 0123)   iohexol (OMNIPAQUE 350) injection 100 mL (75 mLs Intravenous Given 7/17/19 0243)   cefTRIAXone (ROCEPHIN) 1 g in dextrose 5 % 50 mL IVPB (0 g Intravenous Stopped 7/17/19 0429)             Medical Decision Making    Medical Decision Making:   Clinical Tests:   Lab Tests: Ordered and Reviewed  Radiological Study: Ordered and Reviewed           Scribe Attestation:   Scribe #1: I performed the above scribed service and the documentation accurately describes the services I performed. I attest to the accuracy of the note.    Attending:   Physician Attestation Statement for Scribe #1: I, Maranda Pearce Do, MD, personally performed the services described in this documentation, as scribed by Tiny Thornton, in my presence, and it is both accurate and complete.          Clinical Impression       ICD-10-CM ICD-9-CM   1. Small bowel obstruction K56.609 560.9       Disposition:   Disposition: Admitted (Surgery)  Condition: Fair         Maranda Pearce Do, MD  07/17/19 2344

## 2019-07-17 NOTE — OR NURSING
Contacts removed per patient at surgery desk, placed in NS and labeled with patient label. In care of circulator

## 2019-07-17 NOTE — H&P
[facility] -   General Surgery  History & Physical    Patient Name: Janett Murrell  MRN: [id number]  Admission Date: 7/17/2019  Attending Physician: José Antonio Laurent MD   Primary Care Provider: Provider [facility]    Patient information was obtained from patient and ER records.     Subjective:     Chief Complaint/Reason for Admission: small bowel obstruction     History of Present Illness: Janett Murrell is a 39 y.o. female who is about 6 years s/p gastric bypass surgery which was done in [place] by Dr Broussard. She presented to the ED last night c/o epigastric abdominal pain that began 4-5 days ago and gradually worsened. The pain radiates across her abdomen, and is associated with abdominal distention, constipation, nausea and vomiting. She reports occasional mild intermittent symptoms occurring over the last year which resolve quickly. A CT showed dilated small bowel with a transition in the lower abdomen at the distal anastomosis. Cannot rule out small bowel volvulus. General Surgery was called for admission. An NG was placed.     No current facility-administered medications on file prior to encounter.      Current Outpatient Medications on File Prior to Encounter   Medication Sig    busPIRone (BUSPAR) 5 MG Tab Take 5 mg by mouth 2 (two) times daily.    eflornithine (VANIQA) 13.9 % cream Apply topically 2 (two) times daily.    naproxen (NAPROSYN) 375 MG tablet Take 1 tablet (375 mg total) by mouth 2 (two) times daily with meals.    [DISCONTINUED] desogestrel-ethinyl estradiol (DESOGEN) 0.15-30 mg-mcg per tablet Take 1 tablet by mouth Daily.       Review of patient's allergies indicates:  No Known Allergies    History reviewed. No pertinent past medical history.  Past Surgical History:   Procedure Laterality Date    CHOLECYSTECTOMY      GASTRIC BYPASS      tonsilectomy      TONSILLECTOMY       Family History     Problem Relation (Age of Onset)    Breast cancer  Paternal Grandfather, Sister    Diabetes Maternal Grandmother    Hypertension Maternal Grandmother, Father        Tobacco Use    Smoking status: Never Smoker   Substance and Sexual Activity    Alcohol use: No    Drug use: No    Sexual activity: Yes     Review of Systems   Constitutional: Negative for activity change, chills and fever.   Respiratory: Negative for shortness of breath.    Cardiovascular: Negative for chest pain.   Gastrointestinal: Positive for abdominal distention, abdominal pain, constipation, nausea and vomiting.   Genitourinary: Negative for dysuria.   Musculoskeletal: Negative for myalgias.   Skin: Negative for wound.   Neurological: Negative for weakness.   Hematological: Does not bruise/bleed easily.   Psychiatric/Behavioral: The patient is not nervous/anxious.      Objective:     Vital Signs (Most Recent):  Temp: 98 °F (36.7 °C) (07/17/19 0731)  Pulse: (!) 52 (07/17/19 0731)  Resp: 15 (07/17/19 0731)  BP: 114/71 (07/17/19 0731)  SpO2: 100 % (07/17/19 0731) Vital Signs (24h Range):  Temp:  [97.3 °F (36.3 °C)-98 °F (36.7 °C)] 98 °F (36.7 °C)  Pulse:  [52-80] 52  Resp:  [15-18] 15  SpO2:  [98 %-100 %] 100 %  BP: (110-121)/(62-75) 114/71     Weight: 63 kg (138 lb 14.2 oz)  Body mass index is 21.75 kg/m².    Physical Exam   Constitutional: She is oriented to person, place, and time. She appears well-developed and well-nourished.   HENT:   Head: Normocephalic and atraumatic.   Eyes: EOM are normal.   Cardiovascular: Normal rate and regular rhythm.   Pulmonary/Chest: Effort normal. No respiratory distress.   Abdominal: She exhibits distension. There is tenderness (generalized ). No hernia.   Well healed laparoscopic incisions.   Musculoskeletal: Normal range of motion.   Neurological: She is alert and oriented to person, place, and time.   Skin: Skin is warm and dry.   Psychiatric: She has a normal mood and affect. Thought content normal.   Vitals reviewed.      Significant Labs:  CBC:   Recent  Labs   Lab 07/17/19  0110   WBC 9.44   RBC 4.24   HGB 8.4*   HCT 28.7*   *   MCV 68*   MCH 19.8*   MCHC 29.3*     CMP:   Recent Labs   Lab 07/17/19  0110   GLU 87   CALCIUM 9.7   ALBUMIN 4.1   PROT 7.3      K 4.0   CO2 23      BUN 12   CREATININE 0.8   ALKPHOS 83   ALT 13   AST 23   BILITOT 0.6       Significant Diagnostics:  I have reviewed all pertinent imaging results/findings within the past 24 hours.    Assessment/Plan:     Small bowel obstruction  S/p gastric bypass surgery  CT findings concerning for small bowel obstruction at the distal anastomosis, also concern for small bowel volvulus.   NPO, continue NG to LIWS, analgesics, IV hydration.   She will likely need laparoscopic vs open exploration today. This was discussed with the patient who voices understanding.      VTE Risk Mitigation (From admission, onward)    None          Alisai Soares PA-C  General Surgery  [facility]

## 2019-07-17 NOTE — OP NOTE
[facility]  Surgery Department  Operative Note    SUMMARY     Date of Procedure: 7/17/2019     Procedure:   1. Exploratory laparotomy  2. Reduction of intestinal volvulus  3. Repair of internal hernia    Surgeon(s) and Role:     * José Antonio Laurent MD - Primary    Assisting Surgeon: None    Pre-Operative Diagnosis: Small bowel obstruction [K56.609]    Post-Operative Diagnosis: Post-Op Diagnosis Codes:     * Small bowel obstruction [K56.609]    Anesthesia: General    Technical Procedures Used:   1. Exploratory laparotomy  2. Reduction of intestinal volvulus  3. Repair of internal hernia    Indications for Procedure:  39-year-old female with previous Aristides-en-Y gastric bypass who presented with a small-bowel obstruction with a possible volvulus in her small-bowel near her jejuno jejunal anastomosis who presents to the operating for definitive repair    Findings of the Procedure:  Internal hernia through the mesentery near the jejunojejunal anastomosis with volvulus of the small bowel rotated around this access with easy reduction of this volvulus and suture repair of the hernia    Description of the Procedure:  Patient was brought to the operating placed supine on the table. General endotracheal anesthesia was then induced.  A Mccullough catheter was then sterilely placed. The abdomen was prepped and draped in usual sterile fashion.  Preoperative surgical time-out was then performed confirming the correct patient, procedure and preoperative medications given.  A mini midline laparotomy near the umbilicus was made with a knife and dissection was carried down sharply with a Bovie to the level of the fascia which was sharply incised.  The peritoneum was then entered between 2 Kocher clamps using Metzenbaum scissors without any injury to the underlying structures. The remainder of the fascia the incision was then opened for the length of the fascia. A Juan wound protector was then inserted into this defect  and a [place] retractor was placed to assist with identification of the pathology. Immediately upon entering the abdominal cavity there was noted small bowel that was very dilated proximal to her jejuno jejunal anastomosis as well as decompressed distal small bowel to the anastomosis. There was noted to be a small mesenteric defect of around 5 cm where her small-bowel was coming through this internal hernia and causing a volvulus of the distal small bowel. There was no threatened bowel and the bowel all appeared healthy but collapsed.  The bowel was then successfully reduced through the hernia back from the terminal ileum.  The bowel was run from the terminal ileum to the jejuno jejunal anastomosis to confirm no serosal tears and no concern for ischemia was noted. The defect was then evaluated and closed with interrupted 3-0 silk sutures. At the end of this closure, there was no further defect to allow for herniation.  The remainder of the small bowel was then run back and found to be dilated but straight without volvulus or stricture.  The jejuno jejunal anastomosis was evaluated also for stricture but was widely patent. The mesentery was again checked and found to be straight.  The omentum was placed over top.  The nasogastric tube was checked and found to be in good position in the gastric fundus.  The entire abdomen was then checked for hemostasis which was ensured. The Juan wound retractor and [place] then removed. The subfascial, super fascial and subdermal layers were injected with a mixture of 20 cc of Exparel and 30 cc of 0.25% bupivacaine plain with a 10 cc saline injection as well in usual fashion. The fascia of the incision was then run closed with running #1 looped PDS.  The subcutaneous tissue was then copiously irrigated and the skin was closed with a running 4-0 Monocryl suture. Skin glue was then applied. The patient was then woken from general endotracheal anesthesia and taken to the  postanesthesia care in stable condition.  She tolerated procedure well. All sponge, needle and instrument counts were correct at the end of the case.    Significant Surgical Tasks Conducted by the Assistant(s), if Applicable: N/A    Complications: No    Estimated Blood Loss (EBL): 10 mL           Implants: * No implants in log *    Specimens:   Specimen (12h ago, onward)    None                  Condition: Good    Disposition: PACU - hemodynamically stable.    Attestation: I performed the procedure.

## 2019-07-17 NOTE — TRANSFER OF CARE
"Anesthesia Transfer of Care Note    Patient: Janett Murrell    Procedure(s) Performed: Procedure(s) (LRB):  LAPAROTOMY, EXPLORATORY (N/A)  REPAIR, HERNIA (N/A)    Patient location: PACU    Anesthesia Type: general    Transport from OR: Transported from OR on room air with adequate spontaneous ventilation    Post pain: adequate analgesia    Post assessment: no apparent anesthetic complications    Post vital signs: stable    Level of consciousness: awake    Nausea/Vomiting: no nausea/vomiting    Complications: none    Transfer of care protocol was followed      Last vitals:   Visit Vitals  /62 (Patient Position: Lying)   Pulse 89   Temp 36.6 °C (97.9 °F) (Oral)   Resp 14   Ht 5' 7" (1.702 m)   Wt 63 kg (138 lb 14.2 oz)   LMP 07/10/2019   SpO2 98%   Breastfeeding? No   BMI 21.75 kg/m²     "

## 2019-07-17 NOTE — PLAN OF CARE
Problem: Adult Inpatient Plan of Care  Goal: Plan of Care Review  Outcome: Ongoing (interventions implemented as appropriate)  NGT @ [facility]. Pain managed with prn med. Procedure scheduled for today.Fall precautions maintained. IV fluids maintained. Will continue to monitor.

## 2019-07-18 LAB
ANION GAP SERPL CALC-SCNC: 10 MMOL/L (ref 8–16)
ANISOCYTOSIS BLD QL SMEAR: SLIGHT
BASOPHILS # BLD AUTO: 0 K/UL (ref 0–0.2)
BASOPHILS NFR BLD: 0 % (ref 0–1.9)
BUN SERPL-MCNC: 7 MG/DL (ref 6–20)
CALCIUM SERPL-MCNC: 8.8 MG/DL (ref 8.7–10.5)
CHLORIDE SERPL-SCNC: 107 MMOL/L (ref 95–110)
CO2 SERPL-SCNC: 20 MMOL/L (ref 23–29)
CREAT SERPL-MCNC: 0.6 MG/DL (ref 0.5–1.4)
DACRYOCYTES BLD QL SMEAR: ABNORMAL
DIFFERENTIAL METHOD: ABNORMAL
EOSINOPHIL # BLD AUTO: 0 K/UL (ref 0–0.5)
EOSINOPHIL NFR BLD: 0 % (ref 0–8)
ERYTHROCYTE [DISTWIDTH] IN BLOOD BY AUTOMATED COUNT: 16.5 % (ref 11.5–14.5)
EST. GFR  (AFRICAN AMERICAN): >60 ML/MIN/1.73 M^2
EST. GFR  (NON AFRICAN AMERICAN): >60 ML/MIN/1.73 M^2
GLUCOSE SERPL-MCNC: 99 MG/DL (ref 70–110)
HCT VFR BLD AUTO: 27.8 % (ref 37–48.5)
HGB BLD-MCNC: 8 G/DL (ref 12–16)
LYMPHOCYTES # BLD AUTO: 0.6 K/UL (ref 1–4.8)
LYMPHOCYTES NFR BLD: 5.5 % (ref 18–48)
MAGNESIUM SERPL-MCNC: 1.8 MG/DL (ref 1.6–2.6)
MCH RBC QN AUTO: 20 PG (ref 27–31)
MCHC RBC AUTO-ENTMCNC: 28.8 G/DL (ref 32–36)
MCV RBC AUTO: 70 FL (ref 82–98)
MONOCYTES # BLD AUTO: 0.8 K/UL (ref 0.3–1)
MONOCYTES NFR BLD: 7.3 % (ref 4–15)
NEUTROPHILS # BLD AUTO: 10 K/UL (ref 1.8–7.7)
NEUTROPHILS NFR BLD: 87.5 % (ref 38–73)
PHOSPHATE SERPL-MCNC: 4.1 MG/DL (ref 2.7–4.5)
PLATELET # BLD AUTO: 252 K/UL (ref 150–350)
PMV BLD AUTO: 9.1 FL (ref 9.2–12.9)
POIKILOCYTOSIS BLD QL SMEAR: SLIGHT
POTASSIUM SERPL-SCNC: 4.5 MMOL/L (ref 3.5–5.1)
RBC # BLD AUTO: 4 M/UL (ref 4–5.4)
SODIUM SERPL-SCNC: 137 MMOL/L (ref 136–145)
SPHEROCYTES BLD QL SMEAR: ABNORMAL
STOMATOCYTES BLD QL SMEAR: PRESENT
TARGETS BLD QL SMEAR: ABNORMAL
WBC # BLD AUTO: 11.46 K/UL (ref 3.9–12.7)

## 2019-07-18 PROCEDURE — 11000001 HC ACUTE MED/SURG PRIVATE ROOM

## 2019-07-18 PROCEDURE — 99024 POSTOP FOLLOW-UP VISIT: CPT | Mod: ,,, | Performed by: COLON & RECTAL SURGERY

## 2019-07-18 PROCEDURE — 94799 UNLISTED PULMONARY SVC/PX: CPT

## 2019-07-18 PROCEDURE — 85025 COMPLETE CBC W/AUTO DIFF WBC: CPT

## 2019-07-18 PROCEDURE — 63600175 PHARM REV CODE 636 W HCPCS: Performed by: COLON & RECTAL SURGERY

## 2019-07-18 PROCEDURE — 84100 ASSAY OF PHOSPHORUS: CPT

## 2019-07-18 PROCEDURE — 80048 BASIC METABOLIC PNL TOTAL CA: CPT

## 2019-07-18 PROCEDURE — 36415 COLL VENOUS BLD VENIPUNCTURE: CPT

## 2019-07-18 PROCEDURE — 99024 PR POST-OP FOLLOW-UP VISIT: ICD-10-PCS | Mod: ,,, | Performed by: COLON & RECTAL SURGERY

## 2019-07-18 PROCEDURE — 83735 ASSAY OF MAGNESIUM: CPT

## 2019-07-18 PROCEDURE — 99900035 HC TECH TIME PER 15 MIN (STAT)

## 2019-07-18 PROCEDURE — S0028 INJECTION, FAMOTIDINE, 20 MG: HCPCS | Performed by: COLON & RECTAL SURGERY

## 2019-07-18 PROCEDURE — 25000003 PHARM REV CODE 250: Performed by: COLON & RECTAL SURGERY

## 2019-07-18 PROCEDURE — 94770 HC EXHALED C02 TEST: CPT

## 2019-07-18 RX ADMIN — ONDANSETRON 4 MG: 2 INJECTION INTRAMUSCULAR; INTRAVENOUS at 07:07

## 2019-07-18 RX ADMIN — DIPHENHYDRAMINE HYDROCHLORIDE 12.5 MG: 50 INJECTION INTRAMUSCULAR; INTRAVENOUS at 07:07

## 2019-07-18 RX ADMIN — SODIUM CHLORIDE, SODIUM LACTATE, POTASSIUM CHLORIDE, AND CALCIUM CHLORIDE: .6; .31; .03; .02 INJECTION, SOLUTION INTRAVENOUS at 01:07

## 2019-07-18 RX ADMIN — ENOXAPARIN SODIUM 40 MG: 100 INJECTION SUBCUTANEOUS at 08:07

## 2019-07-18 RX ADMIN — MORPHINE SULFATE: 1 INJECTION INTRAVENOUS at 09:07

## 2019-07-18 RX ADMIN — CHLORHEXIDINE GLUCONATE 0.12% ORAL RINSE 10 ML: 1.2 LIQUID ORAL at 08:07

## 2019-07-18 RX ADMIN — ACETAMINOPHEN 1000 MG: 10 INJECTION, SOLUTION INTRAVENOUS at 01:07

## 2019-07-18 RX ADMIN — CHLORHEXIDINE GLUCONATE 0.12% ORAL RINSE 10 ML: 1.2 LIQUID ORAL at 09:07

## 2019-07-18 RX ADMIN — FAMOTIDINE 20 MG: 20 INJECTION, SOLUTION INTRAVENOUS at 09:07

## 2019-07-18 RX ADMIN — FAMOTIDINE 20 MG: 20 INJECTION, SOLUTION INTRAVENOUS at 08:07

## 2019-07-18 RX ADMIN — ACETAMINOPHEN 1000 MG: 10 INJECTION, SOLUTION INTRAVENOUS at 06:07

## 2019-07-18 RX ADMIN — MORPHINE SULFATE: 1 INJECTION INTRAVENOUS at 07:07

## 2019-07-18 NOTE — PROGRESS NOTES
[facility] -   General Surgery  Progress Note    Subjective:     History of Present Illness:  Janett Murrell is a 39 y.o. female who is about 6 years s/p gastric bypass surgery which was done in [place] by Dr Broussard. She presented to the ED last night c/o epigastric abdominal pain that began 4-5 days ago and gradually worsened. The pain radiates across her abdomen, and is associated with abdominal distention, constipation, nausea and vomiting. She reports occasional mild intermittent symptoms occurring over the last year which resolve quickly. A CT showed dilated small bowel with a transition in the lower abdomen at the distal anastomosis. Cannot rule out small bowel volvulus. General Surgery was called for admission. An NG was placed.     Post-Op Info:  Procedure(s) (LRB):  LAPAROTOMY, EXPLORATORY (N/A)  REPAIR, HERNIA (N/A)   1 Day Post-Op     Interval History: Pain well controlled. Some intermittent nausea despite NGT to LWS. Voiding spontaneously. No BM/flatus postop yet.    Medications:  Continuous Infusions:   lactated ringers 125 mL/hr at 07/17/19 2001    morphine       Scheduled Meds:   acetaminophen  1,000 mg Intravenous Q8H    chlorhexidine  10 mL Mouth/Throat BID    enoxaparin  40 mg Subcutaneous Daily    famotidine  20 mg Intravenous BID    nozaseptin   Each Nare BID     PRN Meds:diphenhydrAMINE, morphine, naloxone, ondansetron, promethazine (PHENERGAN) IVPB, sodium chloride 0.9%, sodium chloride 0.9%     Review of patient's allergies indicates:  No Known Allergies  Objective:     Vital Signs (Most Recent):  Temp: 97.8 °F (36.6 °C) (07/18/19 0730)  Pulse: (!) 56 (07/18/19 0730)  Resp: 18 (07/18/19 0730)  BP: 110/65 (07/18/19 0730)  SpO2: 100 % (07/18/19 0730) Vital Signs (24h Range):  Temp:  [97.8 °F (36.6 °C)-98.5 °F (36.9 °C)] 97.8 °F (36.6 °C)  Pulse:  [] 56  Resp:  [12-19] 18  SpO2:  [97 %-100 %] 100 %  BP: ()/(51-70) 110/65     Weight: 63 kg (138 lb  14.2 oz)  Body mass index is 21.75 kg/m².    Intake/Output - Last 3 Shifts       07/16 0700 - 07/17 0659 07/17 0700 - 07/18 0659 07/18 0700 - 07/19 0659    P.O.  0     I.V. (mL/kg)  2486.1 (39.5)     IV Piggyback  200     Total Intake(mL/kg)  2686.1 (42.6)     Urine (mL/kg/hr)  500 (0.3)     Drains  120     Blood  25     Total Output  645     Net  +2041.1            Urine Occurrence  3 x     Emesis Occurrence  0 x           Physical Exam   Constitutional: She is oriented to person, place, and time. She appears well-developed.   HENT:   Head: Normocephalic and atraumatic.   NGT in place with succus in canister   Eyes: Conjunctivae and EOM are normal.   Neck: Normal range of motion. No thyromegaly present.   Cardiovascular: Normal rate and regular rhythm.   Pulmonary/Chest: Effort normal. No respiratory distress.   Abdominal:   Soft, nondistended, appropriately TTP; incision c/d/i without erythema or drainage; no rebound or guarding   Musculoskeletal: Normal range of motion. She exhibits no edema or tenderness.   Neurological: She is alert and oriented to person, place, and time.   Skin: Skin is warm and dry. Capillary refill takes less than 2 seconds. No rash noted.   Psychiatric: She has a normal mood and affect.       Significant Labs:  CBC:   Recent Labs   Lab 07/18/19  0551   WBC 11.46   RBC 4.00   HGB 8.0*   HCT 27.8*      MCV 70*   MCH 20.0*   MCHC 28.8*     BMP:   Recent Labs   Lab 07/18/19  0551   GLU 99      K 4.5      CO2 20*   BUN 7   CREATININE 0.6   CALCIUM 8.8   MG 1.8         Assessment/Plan:     * Internal hernia  POD1 exlap, volvulus reduction and internal hernia repair, awaiting return of bowel fxn    - Cont NGT to LWS  - Cont NPO  - IVF rehydration  - PCA for pain control  - OOB, ambulation encouraged  - IS 10xs/hr while awake      Small bowel volvulus  See plan for internal hernia        José Antonio Laurent MD  General Surgery  [facility]

## 2019-07-18 NOTE — SUBJECTIVE & OBJECTIVE
Interval History: Pain well controlled. Some intermittent nausea despite NGT to LWS. Voiding spontaneously. No BM/flatus postop yet.    Medications:  Continuous Infusions:   lactated ringers 125 mL/hr at 07/17/19 2001    morphine       Scheduled Meds:   acetaminophen  1,000 mg Intravenous Q8H    chlorhexidine  10 mL Mouth/Throat BID    enoxaparin  40 mg Subcutaneous Daily    famotidine  20 mg Intravenous BID    nozaseptin   Each Nare BID     PRN Meds:diphenhydrAMINE, morphine, naloxone, ondansetron, promethazine (PHENERGAN) IVPB, sodium chloride 0.9%, sodium chloride 0.9%     Review of patient's allergies indicates:  No Known Allergies  Objective:     Vital Signs (Most Recent):  Temp: 97.8 °F (36.6 °C) (07/18/19 0730)  Pulse: (!) 56 (07/18/19 0730)  Resp: 18 (07/18/19 0730)  BP: 110/65 (07/18/19 0730)  SpO2: 100 % (07/18/19 0730) Vital Signs (24h Range):  Temp:  [97.8 °F (36.6 °C)-98.5 °F (36.9 °C)] 97.8 °F (36.6 °C)  Pulse:  [] 56  Resp:  [12-19] 18  SpO2:  [97 %-100 %] 100 %  BP: ()/(51-70) 110/65     Weight: 63 kg (138 lb 14.2 oz)  Body mass index is 21.75 kg/m².    Intake/Output - Last 3 Shifts       07/16 0700 - 07/17 0659 07/17 0700 - 07/18 0659 07/18 0700 - 07/19 0659    P.O.  0     I.V. (mL/kg)  2486.1 (39.5)     IV Piggyback  200     Total Intake(mL/kg)  2686.1 (42.6)     Urine (mL/kg/hr)  500 (0.3)     Drains  120     Blood  25     Total Output  645     Net  +2041.1            Urine Occurrence  3 x     Emesis Occurrence  0 x           Physical Exam   Constitutional: She is oriented to person, place, and time. She appears well-developed.   HENT:   Head: Normocephalic and atraumatic.   NGT in place with succus in canister   Eyes: Conjunctivae and EOM are normal.   Neck: Normal range of motion. No thyromegaly present.   Cardiovascular: Normal rate and regular rhythm.   Pulmonary/Chest: Effort normal. No respiratory distress.   Abdominal:   Soft, nondistended, appropriately TTP; incision  c/d/i without erythema or drainage; no rebound or guarding   Musculoskeletal: Normal range of motion. She exhibits no edema or tenderness.   Neurological: She is alert and oriented to person, place, and time.   Skin: Skin is warm and dry. Capillary refill takes less than 2 seconds. No rash noted.   Psychiatric: She has a normal mood and affect.       Significant Labs:  CBC:   Recent Labs   Lab 07/18/19  0551   WBC 11.46   RBC 4.00   HGB 8.0*   HCT 27.8*      MCV 70*   MCH 20.0*   MCHC 28.8*     BMP:   Recent Labs   Lab 07/18/19  0551   GLU 99      K 4.5      CO2 20*   BUN 7   CREATININE 0.6   CALCIUM 8.8   MG 1.8

## 2019-07-18 NOTE — ASSESSMENT & PLAN NOTE
POD1 exlap, volvulus reduction and internal hernia repair, awaiting return of bowel fxn    - Cont NGT to LWS  - Cont NPO  - IVF rehydration  - PCA for pain control  - OOB, ambulation encouraged  - IS 10xs/hr while awake

## 2019-07-18 NOTE — PLAN OF CARE
Problem: Adult Inpatient Plan of Care  Goal: Plan of Care Review  Outcome: Ongoing (interventions implemented as appropriate)  PCA for pain control. Pt appeared to rest during shift. Pt voided during shift. VSS. NG to LIWS. Family at bedside. Pt ambulated to bathroom with one assist.   Fall precautions in place. Call light and personal items within reach. Educated patient on side effects of medication administered. Pt verbalized understanding. 24 hour order check done.  Will continue to monitor.

## 2019-07-18 NOTE — PLAN OF CARE
Problem: Adult Inpatient Plan of Care  Goal: Plan of Care Review  Outcome: Ongoing (interventions implemented as appropriate)  NG to right nare on low continuous suction. PCA pump in use. LR infusing @ 125mL/hr. No c/o of nausea or vomiting. Will continue to monitor.

## 2019-07-19 LAB
ANION GAP SERPL CALC-SCNC: 11 MMOL/L (ref 8–16)
BACTERIA UR CULT: ABNORMAL
BASOPHILS # BLD AUTO: 0.01 K/UL (ref 0–0.2)
BASOPHILS NFR BLD: 0.1 % (ref 0–1.9)
BUN SERPL-MCNC: 7 MG/DL (ref 6–20)
CALCIUM SERPL-MCNC: 8.8 MG/DL (ref 8.7–10.5)
CHLORIDE SERPL-SCNC: 105 MMOL/L (ref 95–110)
CO2 SERPL-SCNC: 22 MMOL/L (ref 23–29)
CREAT SERPL-MCNC: 0.6 MG/DL (ref 0.5–1.4)
DIFFERENTIAL METHOD: ABNORMAL
EOSINOPHIL # BLD AUTO: 0.1 K/UL (ref 0–0.5)
EOSINOPHIL NFR BLD: 1.5 % (ref 0–8)
ERYTHROCYTE [DISTWIDTH] IN BLOOD BY AUTOMATED COUNT: 16.8 % (ref 11.5–14.5)
EST. GFR  (AFRICAN AMERICAN): >60 ML/MIN/1.73 M^2
EST. GFR  (NON AFRICAN AMERICAN): >60 ML/MIN/1.73 M^2
GLUCOSE SERPL-MCNC: 74 MG/DL (ref 70–110)
HCT VFR BLD AUTO: 25.1 % (ref 37–48.5)
HGB BLD-MCNC: 7.1 G/DL (ref 12–16)
LYMPHOCYTES # BLD AUTO: 1.7 K/UL (ref 1–4.8)
LYMPHOCYTES NFR BLD: 23.5 % (ref 18–48)
MAGNESIUM SERPL-MCNC: 1.7 MG/DL (ref 1.6–2.6)
MCH RBC QN AUTO: 19.7 PG (ref 27–31)
MCHC RBC AUTO-ENTMCNC: 28.3 G/DL (ref 32–36)
MCV RBC AUTO: 70 FL (ref 82–98)
MONOCYTES # BLD AUTO: 0.5 K/UL (ref 0.3–1)
MONOCYTES NFR BLD: 7.4 % (ref 4–15)
NEUTROPHILS # BLD AUTO: 4.8 K/UL (ref 1.8–7.7)
NEUTROPHILS NFR BLD: 67.6 % (ref 38–73)
PHOSPHATE SERPL-MCNC: 3 MG/DL (ref 2.7–4.5)
PLATELET # BLD AUTO: 234 K/UL (ref 150–350)
PMV BLD AUTO: 9 FL (ref 9.2–12.9)
POTASSIUM SERPL-SCNC: 3.7 MMOL/L (ref 3.5–5.1)
RBC # BLD AUTO: 3.6 M/UL (ref 4–5.4)
SODIUM SERPL-SCNC: 138 MMOL/L (ref 136–145)
WBC # BLD AUTO: 7.14 K/UL (ref 3.9–12.7)

## 2019-07-19 PROCEDURE — 94760 N-INVAS EAR/PLS OXIMETRY 1: CPT

## 2019-07-19 PROCEDURE — 36415 COLL VENOUS BLD VENIPUNCTURE: CPT

## 2019-07-19 PROCEDURE — 11000001 HC ACUTE MED/SURG PRIVATE ROOM

## 2019-07-19 PROCEDURE — 94770 HC EXHALED C02 TEST: CPT

## 2019-07-19 PROCEDURE — 94799 UNLISTED PULMONARY SVC/PX: CPT

## 2019-07-19 PROCEDURE — 85025 COMPLETE CBC W/AUTO DIFF WBC: CPT

## 2019-07-19 PROCEDURE — 25000003 PHARM REV CODE 250: Performed by: COLON & RECTAL SURGERY

## 2019-07-19 PROCEDURE — 80048 BASIC METABOLIC PNL TOTAL CA: CPT

## 2019-07-19 PROCEDURE — 63600175 PHARM REV CODE 636 W HCPCS: Performed by: COLON & RECTAL SURGERY

## 2019-07-19 PROCEDURE — 83735 ASSAY OF MAGNESIUM: CPT

## 2019-07-19 PROCEDURE — 99900035 HC TECH TIME PER 15 MIN (STAT)

## 2019-07-19 PROCEDURE — S0028 INJECTION, FAMOTIDINE, 20 MG: HCPCS | Performed by: COLON & RECTAL SURGERY

## 2019-07-19 PROCEDURE — 84100 ASSAY OF PHOSPHORUS: CPT

## 2019-07-19 RX ADMIN — FAMOTIDINE 20 MG: 20 INJECTION, SOLUTION INTRAVENOUS at 09:07

## 2019-07-19 RX ADMIN — SODIUM CHLORIDE, SODIUM LACTATE, POTASSIUM CHLORIDE, AND CALCIUM CHLORIDE: .6; .31; .03; .02 INJECTION, SOLUTION INTRAVENOUS at 03:07

## 2019-07-19 RX ADMIN — MORPHINE SULFATE: 1 INJECTION INTRAVENOUS at 09:07

## 2019-07-19 RX ADMIN — CHLORHEXIDINE GLUCONATE 0.12% ORAL RINSE 10 ML: 1.2 LIQUID ORAL at 09:07

## 2019-07-19 RX ADMIN — SODIUM CHLORIDE, SODIUM LACTATE, POTASSIUM CHLORIDE, AND CALCIUM CHLORIDE: .6; .31; .03; .02 INJECTION, SOLUTION INTRAVENOUS at 01:07

## 2019-07-19 RX ADMIN — ENOXAPARIN SODIUM 40 MG: 100 INJECTION SUBCUTANEOUS at 05:07

## 2019-07-19 NOTE — PROGRESS NOTES
[facility] -   General Surgery  Progress Note    Subjective:     History of Present Illness:  Janett Murrell is a 39 y.o. female who is about 6 years s/p gastric bypass surgery which was done in [place] by Dr Broussard. She presented to the ED last night c/o epigastric abdominal pain that began 4-5 days ago and gradually worsened. The pain radiates across her abdomen, and is associated with abdominal distention, constipation, nausea and vomiting. She reports occasional mild intermittent symptoms occurring over the last year which resolve quickly. A CT showed dilated small bowel with a transition in the lower abdomen at the distal anastomosis. Cannot rule out small bowel volvulus. General Surgery was called for admission. An NG was placed.     Post-Op Info:  Procedure(s) (LRB):  LAPAROTOMY, EXPLORATORY (N/A)  REPAIR, HERNIA (N/A)   2 Days Post-Op     Interval History: she is hungry. No nausea. No bm/flatus. Pain controlled.     Medications:  Continuous Infusions:   lactated ringers 125 mL/hr at 07/19/19 0349    morphine       Scheduled Meds:   chlorhexidine  10 mL Mouth/Throat BID    enoxaparin  40 mg Subcutaneous Daily    famotidine  20 mg Intravenous BID    nozaseptin   Each Nare BID     PRN Meds:diphenhydrAMINE, morphine, naloxone, ondansetron, promethazine (PHENERGAN) IVPB, sodium chloride 0.9%, sodium chloride 0.9%     Review of patient's allergies indicates:  No Known Allergies  Objective:     Vital Signs (Most Recent):  Temp: 98.4 °F (36.9 °C) (07/19/19 0722)  Pulse: 73 (07/19/19 0733)  Resp: 12 (07/19/19 0733)  BP: 113/70 (07/19/19 0722)  SpO2: 100 % (07/19/19 0733) Vital Signs (24h Range):  Temp:  [98 °F (36.7 °C)-98.4 °F (36.9 °C)] 98.4 °F (36.9 °C)  Pulse:  [57-88] 73  Resp:  [12-18] 12  SpO2:  [96 %-100 %] 100 %  BP: ()/(50-70) 113/70     Weight: 63 kg (138 lb 14.2 oz)  Body mass index is 21.75 kg/m².    Intake/Output - Last 3 Shifts       07/17 0700 - 07/18 0659  07/18 0700 - 07/19 0659 07/19 0700 - 07/20 0659    P.O. 0 0 0    I.V. (mL/kg) 2486.1 (39.5) 3130.8 (49.7) 7.6 (0.1)    IV Piggyback 200 100     Total Intake(mL/kg) 2686.1 (42.6) 3230.8 (51.3) 7.6 (0.1)    Urine (mL/kg/hr) 500 (0.3) 0 (0)     Drains 120 30     Blood 25      Total Output 645 30     Net +2041.1 +3200.8 +7.6           Urine Occurrence 3 x 2 x     Emesis Occurrence 0 x            Physical Exam   Constitutional: She is oriented to person, place, and time. She appears well-developed and well-nourished.   HENT:   Head: Normocephalic and atraumatic.   Eyes: EOM are normal.   Cardiovascular: Normal rate and regular rhythm.   Pulmonary/Chest: Effort normal. No respiratory distress.   Abdominal: Soft.   Incision c/d/i. Abdomen soft, non distended. Mild incisional ttp   Musculoskeletal: Normal range of motion.   Neurological: She is alert and oriented to person, place, and time.   Skin: Skin is warm and dry.   Psychiatric: She has a normal mood and affect. Thought content normal.   Vitals reviewed.      Significant Labs:  CBC:   Recent Labs   Lab 07/19/19  0528   WBC 7.14   RBC 3.60*   HGB 7.1*   HCT 25.1*      MCV 70*   MCH 19.7*   MCHC 28.3*     CMP:   Recent Labs   Lab 07/17/19  0110  07/19/19  0528   GLU 87   < > 74   CALCIUM 9.7   < > 8.8   ALBUMIN 4.1  --   --    PROT 7.3  --   --       < > 138   K 4.0   < > 3.7   CO2 23   < > 22*      < > 105   BUN 12   < > 7   CREATININE 0.8   < > 0.6   ALKPHOS 83  --   --    ALT 13  --   --    AST 23  --   --    BILITOT 0.6  --   --     < > = values in this interval not displayed.       Significant Diagnostics:  I have reviewed all pertinent imaging results/findings within the past 24 hours.    Assessment/Plan:     * Internal hernia  POD2 exlap, volvulus reduction and internal hernia repair, awaiting return of bowel fxn    - Cont NGT to LWS  - Cont NPO  - IVF rehydration  - OOB, ambulation encouraged  - IS 10xs/hr while awake      Small bowel  volvulus  See plan for internal hernia        Alisia Soares PA-C  General Surgery  [facility]

## 2019-07-19 NOTE — PLAN OF CARE
Problem: Adult Inpatient Plan of Care  Goal: Plan of Care Review  Outcome: Ongoing (interventions implemented as appropriate)  NG tube removed. Maintained NPO. PCA and continuous fluids infusing. No c/o nausea or vomiting. Will continue to monitor.

## 2019-07-19 NOTE — ASSESSMENT & PLAN NOTE
POD2 exlap, volvulus reduction and internal hernia repair, awaiting return of bowel fxn    - Cont NGT to LWS  - Cont NPO  - IVF rehydration  - OOB, ambulation encouraged  - IS 10xs/hr while awake

## 2019-07-19 NOTE — SUBJECTIVE & OBJECTIVE
Interval History: she is hungry. No nausea. No bm/flatus. Pain controlled.     Medications:  Continuous Infusions:   lactated ringers 125 mL/hr at 07/19/19 0349    morphine       Scheduled Meds:   chlorhexidine  10 mL Mouth/Throat BID    enoxaparin  40 mg Subcutaneous Daily    famotidine  20 mg Intravenous BID    nozaseptin   Each Nare BID     PRN Meds:diphenhydrAMINE, morphine, naloxone, ondansetron, promethazine (PHENERGAN) IVPB, sodium chloride 0.9%, sodium chloride 0.9%     Review of patient's allergies indicates:  No Known Allergies  Objective:     Vital Signs (Most Recent):  Temp: 98.4 °F (36.9 °C) (07/19/19 0722)  Pulse: 73 (07/19/19 0733)  Resp: 12 (07/19/19 0733)  BP: 113/70 (07/19/19 0722)  SpO2: 100 % (07/19/19 0733) Vital Signs (24h Range):  Temp:  [98 °F (36.7 °C)-98.4 °F (36.9 °C)] 98.4 °F (36.9 °C)  Pulse:  [57-88] 73  Resp:  [12-18] 12  SpO2:  [96 %-100 %] 100 %  BP: ()/(50-70) 113/70     Weight: 63 kg (138 lb 14.2 oz)  Body mass index is 21.75 kg/m².    Intake/Output - Last 3 Shifts       07/17 0700 - 07/18 0659 07/18 0700 - 07/19 0659 07/19 0700 - 07/20 0659    P.O. 0 0 0    I.V. (mL/kg) 2486.1 (39.5) 3130.8 (49.7) 7.6 (0.1)    IV Piggyback 200 100     Total Intake(mL/kg) 2686.1 (42.6) 3230.8 (51.3) 7.6 (0.1)    Urine (mL/kg/hr) 500 (0.3) 0 (0)     Drains 120 30     Blood 25      Total Output 645 30     Net +2041.1 +3200.8 +7.6           Urine Occurrence 3 x 2 x     Emesis Occurrence 0 x            Physical Exam   Constitutional: She is oriented to person, place, and time. She appears well-developed and well-nourished.   HENT:   Head: Normocephalic and atraumatic.   Eyes: EOM are normal.   Cardiovascular: Normal rate and regular rhythm.   Pulmonary/Chest: Effort normal. No respiratory distress.   Abdominal: Soft.   Incision c/d/i. Abdomen soft, non distended. Mild incisional ttp   Musculoskeletal: Normal range of motion.   Neurological: She is alert and oriented to person, place, and  time.   Skin: Skin is warm and dry.   Psychiatric: She has a normal mood and affect. Thought content normal.   Vitals reviewed.      Significant Labs:  CBC:   Recent Labs   Lab 07/19/19  0528   WBC 7.14   RBC 3.60*   HGB 7.1*   HCT 25.1*      MCV 70*   MCH 19.7*   MCHC 28.3*     CMP:   Recent Labs   Lab 07/17/19  0110  07/19/19  0528   GLU 87   < > 74   CALCIUM 9.7   < > 8.8   ALBUMIN 4.1  --   --    PROT 7.3  --   --       < > 138   K 4.0   < > 3.7   CO2 23   < > 22*      < > 105   BUN 12   < > 7   CREATININE 0.8   < > 0.6   ALKPHOS 83  --   --    ALT 13  --   --    AST 23  --   --    BILITOT 0.6  --   --     < > = values in this interval not displayed.       Significant Diagnostics:  I have reviewed all pertinent imaging results/findings within the past 24 hours.

## 2019-07-20 LAB
ANION GAP SERPL CALC-SCNC: 10 MMOL/L (ref 8–16)
BASOPHILS # BLD AUTO: 0.01 K/UL (ref 0–0.2)
BASOPHILS NFR BLD: 0.2 % (ref 0–1.9)
BUN SERPL-MCNC: 5 MG/DL (ref 6–20)
CALCIUM SERPL-MCNC: 8.9 MG/DL (ref 8.7–10.5)
CHLORIDE SERPL-SCNC: 104 MMOL/L (ref 95–110)
CO2 SERPL-SCNC: 24 MMOL/L (ref 23–29)
CREAT SERPL-MCNC: 0.6 MG/DL (ref 0.5–1.4)
DIFFERENTIAL METHOD: ABNORMAL
EOSINOPHIL # BLD AUTO: 0.1 K/UL (ref 0–0.5)
EOSINOPHIL NFR BLD: 1.4 % (ref 0–8)
ERYTHROCYTE [DISTWIDTH] IN BLOOD BY AUTOMATED COUNT: 16.8 % (ref 11.5–14.5)
EST. GFR  (AFRICAN AMERICAN): >60 ML/MIN/1.73 M^2
EST. GFR  (NON AFRICAN AMERICAN): >60 ML/MIN/1.73 M^2
GLUCOSE SERPL-MCNC: 74 MG/DL (ref 70–110)
HCT VFR BLD AUTO: 24.4 % (ref 37–48.5)
HGB BLD-MCNC: 7 G/DL (ref 12–16)
LYMPHOCYTES # BLD AUTO: 1.7 K/UL (ref 1–4.8)
LYMPHOCYTES NFR BLD: 32.7 % (ref 18–48)
MAGNESIUM SERPL-MCNC: 1.6 MG/DL (ref 1.6–2.6)
MCH RBC QN AUTO: 20 PG (ref 27–31)
MCHC RBC AUTO-ENTMCNC: 28.7 G/DL (ref 32–36)
MCV RBC AUTO: 70 FL (ref 82–98)
MONOCYTES # BLD AUTO: 0.3 K/UL (ref 0.3–1)
MONOCYTES NFR BLD: 6.6 % (ref 4–15)
NEUTROPHILS # BLD AUTO: 3.1 K/UL (ref 1.8–7.7)
NEUTROPHILS NFR BLD: 59.3 % (ref 38–73)
PHOSPHATE SERPL-MCNC: 3.3 MG/DL (ref 2.7–4.5)
PLATELET # BLD AUTO: 220 K/UL (ref 150–350)
PMV BLD AUTO: 9.2 FL (ref 9.2–12.9)
POTASSIUM SERPL-SCNC: 3.6 MMOL/L (ref 3.5–5.1)
RBC # BLD AUTO: 3.5 M/UL (ref 4–5.4)
SODIUM SERPL-SCNC: 138 MMOL/L (ref 136–145)
WBC # BLD AUTO: 5.17 K/UL (ref 3.9–12.7)

## 2019-07-20 PROCEDURE — 36415 COLL VENOUS BLD VENIPUNCTURE: CPT

## 2019-07-20 PROCEDURE — S0028 INJECTION, FAMOTIDINE, 20 MG: HCPCS | Performed by: COLON & RECTAL SURGERY

## 2019-07-20 PROCEDURE — 80048 BASIC METABOLIC PNL TOTAL CA: CPT

## 2019-07-20 PROCEDURE — 25000003 PHARM REV CODE 250: Performed by: COLON & RECTAL SURGERY

## 2019-07-20 PROCEDURE — 83735 ASSAY OF MAGNESIUM: CPT

## 2019-07-20 PROCEDURE — 99024 POSTOP FOLLOW-UP VISIT: CPT | Mod: ,,, | Performed by: COLON & RECTAL SURGERY

## 2019-07-20 PROCEDURE — 94799 UNLISTED PULMONARY SVC/PX: CPT

## 2019-07-20 PROCEDURE — 99024 PR POST-OP FOLLOW-UP VISIT: ICD-10-PCS | Mod: ,,, | Performed by: COLON & RECTAL SURGERY

## 2019-07-20 PROCEDURE — 94760 N-INVAS EAR/PLS OXIMETRY 1: CPT

## 2019-07-20 PROCEDURE — 85025 COMPLETE CBC W/AUTO DIFF WBC: CPT

## 2019-07-20 PROCEDURE — 99900035 HC TECH TIME PER 15 MIN (STAT)

## 2019-07-20 PROCEDURE — 63600175 PHARM REV CODE 636 W HCPCS: Performed by: COLON & RECTAL SURGERY

## 2019-07-20 PROCEDURE — 84100 ASSAY OF PHOSPHORUS: CPT

## 2019-07-20 PROCEDURE — 94770 HC EXHALED C02 TEST: CPT

## 2019-07-20 PROCEDURE — 11000001 HC ACUTE MED/SURG PRIVATE ROOM

## 2019-07-20 RX ORDER — ACETAMINOPHEN 325 MG/1
650 TABLET ORAL EVERY 6 HOURS
Status: DISCONTINUED | OUTPATIENT
Start: 2019-07-20 | End: 2019-07-23 | Stop reason: HOSPADM

## 2019-07-20 RX ORDER — DOCUSATE SODIUM 100 MG/1
100 CAPSULE, LIQUID FILLED ORAL 2 TIMES DAILY
Status: DISCONTINUED | OUTPATIENT
Start: 2019-07-20 | End: 2019-07-23 | Stop reason: HOSPADM

## 2019-07-20 RX ORDER — MORPHINE SULFATE 4 MG/ML
4 INJECTION, SOLUTION INTRAMUSCULAR; INTRAVENOUS EVERY 4 HOURS PRN
Status: DISCONTINUED | OUTPATIENT
Start: 2019-07-20 | End: 2019-07-23 | Stop reason: HOSPADM

## 2019-07-20 RX ORDER — OXYCODONE HYDROCHLORIDE 5 MG/1
10 TABLET ORAL EVERY 4 HOURS PRN
Status: DISCONTINUED | OUTPATIENT
Start: 2019-07-20 | End: 2019-07-23 | Stop reason: HOSPADM

## 2019-07-20 RX ORDER — SODIUM CHLORIDE 450 MG/100ML
INJECTION, SOLUTION INTRAVENOUS CONTINUOUS
Status: DISCONTINUED | OUTPATIENT
Start: 2019-07-20 | End: 2019-07-23 | Stop reason: HOSPADM

## 2019-07-20 RX ORDER — OXYCODONE HYDROCHLORIDE 5 MG/1
5 TABLET ORAL EVERY 4 HOURS PRN
Status: DISCONTINUED | OUTPATIENT
Start: 2019-07-20 | End: 2019-07-23 | Stop reason: HOSPADM

## 2019-07-20 RX ORDER — SIMETHICONE 80 MG
2 TABLET,CHEWABLE ORAL
Status: DISCONTINUED | OUTPATIENT
Start: 2019-07-20 | End: 2019-07-23 | Stop reason: HOSPADM

## 2019-07-20 RX ADMIN — FAMOTIDINE 20 MG: 20 INJECTION, SOLUTION INTRAVENOUS at 09:07

## 2019-07-20 RX ADMIN — SODIUM CHLORIDE: 0.45 INJECTION, SOLUTION INTRAVENOUS at 11:07

## 2019-07-20 RX ADMIN — SIMETHICONE CHEW TAB 80 MG 160 MG: 80 TABLET ORAL at 06:07

## 2019-07-20 RX ADMIN — SIMETHICONE CHEW TAB 80 MG 160 MG: 80 TABLET ORAL at 11:07

## 2019-07-20 RX ADMIN — ACETAMINOPHEN 650 MG: 325 TABLET ORAL at 11:07

## 2019-07-20 RX ADMIN — CHLORHEXIDINE GLUCONATE 0.12% ORAL RINSE 10 ML: 1.2 LIQUID ORAL at 09:07

## 2019-07-20 RX ADMIN — DOCUSATE SODIUM 100 MG: 100 CAPSULE, LIQUID FILLED ORAL at 09:07

## 2019-07-20 RX ADMIN — MORPHINE SULFATE: 1 INJECTION INTRAVENOUS at 03:07

## 2019-07-20 RX ADMIN — ACETAMINOPHEN 650 MG: 325 TABLET ORAL at 05:07

## 2019-07-20 RX ADMIN — ENOXAPARIN SODIUM 40 MG: 100 INJECTION SUBCUTANEOUS at 05:07

## 2019-07-20 RX ADMIN — OXYCODONE HYDROCHLORIDE 5 MG: 5 TABLET ORAL at 09:07

## 2019-07-20 RX ADMIN — SIMETHICONE CHEW TAB 80 MG 160 MG: 80 TABLET ORAL at 09:07

## 2019-07-20 RX ADMIN — DOCUSATE SODIUM 100 MG: 100 CAPSULE, LIQUID FILLED ORAL at 11:07

## 2019-07-20 NOTE — SUBJECTIVE & OBJECTIVE
Interval History: Pain well controlled. NGT removed yesterday and grayson CLD well. Some gas pains per patient. Passing flatus, no BM.     Medications:  Continuous Infusions:   sodium chloride 0.45% 50 mL/hr at 07/20/19 1118     Scheduled Meds:   acetaminophen  650 mg Oral Q6H    chlorhexidine  10 mL Mouth/Throat BID    docusate sodium  100 mg Oral BID    enoxaparin  40 mg Subcutaneous Daily    famotidine  20 mg Intravenous BID    nozaseptin   Each Nare BID    simethicone  2 tablet Oral QID (PC + HS)     PRN Meds:diphenhydrAMINE, morphine, naloxone, ondansetron, oxyCODONE, oxyCODONE, promethazine (PHENERGAN) IVPB, sodium chloride 0.9%, sodium chloride 0.9%     Review of patient's allergies indicates:  No Known Allergies  Objective:     Vital Signs (Most Recent):  Temp: 98.2 °F (36.8 °C) (07/20/19 0736)  Pulse: 72 (07/20/19 0736)  Resp: 18 (07/20/19 0736)  BP: 113/70 (07/20/19 0736)  SpO2: 100 % (07/20/19 0736) Vital Signs (24h Range):  Temp:  [97.8 °F (36.6 °C)-98.4 °F (36.9 °C)] 98.2 °F (36.8 °C)  Pulse:  [72-80] 72  Resp:  [11-18] 18  SpO2:  [98 %-100 %] 100 %  BP: (102-124)/(54-82) 113/70     Weight: 63 kg (138 lb 14.2 oz)  Body mass index is 21.75 kg/m².    Intake/Output - Last 3 Shifts       07/18 0700 - 07/19 0659 07/19 0700 - 07/20 0659 07/20 0700 - 07/21 0659    P.O. 0 240     I.V. (mL/kg) 3130.8 (49.7) 3114.7 (49.4) 6 (0.1)    IV Piggyback 100 100     Total Intake(mL/kg) 3230.8 (51.3) 3454.7 (54.8) 6 (0.1)    Urine (mL/kg/hr) 0 (0)      Drains 30      Blood       Total Output 30      Net +3200.8 +3454.7 +6           Urine Occurrence 2 x 6 x           Physical Exam   Constitutional: She is oriented to person, place, and time. She appears well-developed.   HENT:   Head: Normocephalic and atraumatic.   Eyes: Conjunctivae and EOM are normal.   Neck: Normal range of motion. No thyromegaly present.   Cardiovascular: Normal rate and regular rhythm.   Pulmonary/Chest: Effort normal. No respiratory distress.    Abdominal:   Soft, nondistended, appropriately TTP; incision c/d/i without erythema or drainage; no rebound or guarding   Musculoskeletal: Normal range of motion. She exhibits no edema or tenderness.   Neurological: She is alert and oriented to person, place, and time.   Skin: Skin is warm and dry. Capillary refill takes less than 2 seconds. No rash noted.   Psychiatric: She has a normal mood and affect.       Significant Labs:  CBC:   Recent Labs   Lab 07/20/19  0529   WBC 5.17   RBC 3.50*   HGB 7.0*   HCT 24.4*      MCV 70*   MCH 20.0*   MCHC 28.7*     BMP:   Recent Labs   Lab 07/20/19  0529   GLU 74      K 3.6      CO2 24   BUN 5*   CREATININE 0.6   CALCIUM 8.9   MG 1.6

## 2019-07-20 NOTE — PLAN OF CARE
Problem: Adult Inpatient Plan of Care  Goal: Plan of Care Review  Outcome: Ongoing (interventions implemented as appropriate)  Pt had no adverse events during shift. Pt free of falls. Call light in reach. Side rails x 2. Pain well controlled w/ PCA pump. Pt repositions independently. IVF administered as ordered. Started CL diet-pt denies nausea/vomiting. Passing flatus. Incision CDI. VSS. Chart reviewed, will continue to monitor.

## 2019-07-20 NOTE — ASSESSMENT & PLAN NOTE
POD3 exlap, volvulus reduction and internal hernia repair, +flatus, no BM yet postop    - Advance to reg diet  - Add simethicone  - DC PCA, start PO pain control  - decrease IVF  - OOB, ambulation encouraged  - IS 10xs/hr while awake

## 2019-07-20 NOTE — PLAN OF CARE
Problem: Adult Inpatient Plan of Care  Goal: Plan of Care Review  Outcome: Ongoing (interventions implemented as appropriate)  PCA morphine dc'd. IV fluids changed to 1/2NS @ 50mL/hr. C/o abd discomfort, offered PRN pain med, pt refused. Tylenol scheduled every 6 hours. Ambulated in hallway. Will continue to monitor.

## 2019-07-20 NOTE — PROGRESS NOTES
[facility] -   General Surgery  Progress Note    Subjective:     History of Present Illness:  Janett Murrell is a 39 y.o. female who is about 6 years s/p gastric bypass surgery which was done in [place] by Dr Broussard. She presented to the ED last night c/o epigastric abdominal pain that began 4-5 days ago and gradually worsened. The pain radiates across her abdomen, and is associated with abdominal distention, constipation, nausea and vomiting. She reports occasional mild intermittent symptoms occurring over the last year which resolve quickly. A CT showed dilated small bowel with a transition in the lower abdomen at the distal anastomosis. Cannot rule out small bowel volvulus. General Surgery was called for admission. An NG was placed.     Post-Op Info:  Procedure(s) (LRB):  LAPAROTOMY, EXPLORATORY (N/A)  REPAIR, HERNIA (N/A)   3 Days Post-Op     Interval History: Pain well controlled. NGT removed yesterday and grayson CLD well. Some gas pains per patient. Passing flatus, no BM.     Medications:  Continuous Infusions:   sodium chloride 0.45% 50 mL/hr at 07/20/19 1118     Scheduled Meds:   acetaminophen  650 mg Oral Q6H    chlorhexidine  10 mL Mouth/Throat BID    docusate sodium  100 mg Oral BID    enoxaparin  40 mg Subcutaneous Daily    famotidine  20 mg Intravenous BID    nozaseptin   Each Nare BID    simethicone  2 tablet Oral QID (PC + HS)     PRN Meds:diphenhydrAMINE, morphine, naloxone, ondansetron, oxyCODONE, oxyCODONE, promethazine (PHENERGAN) IVPB, sodium chloride 0.9%, sodium chloride 0.9%     Review of patient's allergies indicates:  No Known Allergies  Objective:     Vital Signs (Most Recent):  Temp: 98.2 °F (36.8 °C) (07/20/19 0736)  Pulse: 72 (07/20/19 0736)  Resp: 18 (07/20/19 0736)  BP: 113/70 (07/20/19 0736)  SpO2: 100 % (07/20/19 0736) Vital Signs (24h Range):  Temp:  [97.8 °F (36.6 °C)-98.4 °F (36.9 °C)] 98.2 °F (36.8 °C)  Pulse:  [72-80] 72  Resp:  [11-18]  18  SpO2:  [98 %-100 %] 100 %  BP: (102-124)/(54-82) 113/70     Weight: 63 kg (138 lb 14.2 oz)  Body mass index is 21.75 kg/m².    Intake/Output - Last 3 Shifts       07/18 0700 - 07/19 0659 07/19 0700 - 07/20 0659 07/20 0700 - 07/21 0659    P.O. 0 240     I.V. (mL/kg) 3130.8 (49.7) 3114.7 (49.4) 6 (0.1)    IV Piggyback 100 100     Total Intake(mL/kg) 3230.8 (51.3) 3454.7 (54.8) 6 (0.1)    Urine (mL/kg/hr) 0 (0)      Drains 30      Blood       Total Output 30      Net +3200.8 +3454.7 +6           Urine Occurrence 2 x 6 x           Physical Exam   Constitutional: She is oriented to person, place, and time. She appears well-developed.   HENT:   Head: Normocephalic and atraumatic.   Eyes: Conjunctivae and EOM are normal.   Neck: Normal range of motion. No thyromegaly present.   Cardiovascular: Normal rate and regular rhythm.   Pulmonary/Chest: Effort normal. No respiratory distress.   Abdominal:   Soft, nondistended, appropriately TTP; incision c/d/i without erythema or drainage; no rebound or guarding   Musculoskeletal: Normal range of motion. She exhibits no edema or tenderness.   Neurological: She is alert and oriented to person, place, and time.   Skin: Skin is warm and dry. Capillary refill takes less than 2 seconds. No rash noted.   Psychiatric: She has a normal mood and affect.       Significant Labs:  CBC:   Recent Labs   Lab 07/20/19  0529   WBC 5.17   RBC 3.50*   HGB 7.0*   HCT 24.4*      MCV 70*   MCH 20.0*   MCHC 28.7*     BMP:   Recent Labs   Lab 07/20/19  0529   GLU 74      K 3.6      CO2 24   BUN 5*   CREATININE 0.6   CALCIUM 8.9   MG 1.6         Assessment/Plan:     * Internal hernia  POD3 exlap, volvulus reduction and internal hernia repair, +flatus, no BM yet postop    - Advance to reg diet  - Add simethicone  - DC PCA, start PO pain control  - decrease IVF  - OOB, ambulation encouraged  - IS 10xs/hr while awake      Small bowel volvulus  See plan for internal hernia        José Antonio  ARACELI Laurent MD  General Surgery  [facility]

## 2019-07-21 LAB
ANION GAP SERPL CALC-SCNC: 9 MMOL/L (ref 8–16)
BASOPHILS # BLD AUTO: 0.01 K/UL (ref 0–0.2)
BASOPHILS NFR BLD: 0.2 % (ref 0–1.9)
BUN SERPL-MCNC: 8 MG/DL (ref 6–20)
CALCIUM SERPL-MCNC: 8.5 MG/DL (ref 8.7–10.5)
CHLORIDE SERPL-SCNC: 106 MMOL/L (ref 95–110)
CO2 SERPL-SCNC: 24 MMOL/L (ref 23–29)
CREAT SERPL-MCNC: 0.6 MG/DL (ref 0.5–1.4)
DACRYOCYTES BLD QL SMEAR: ABNORMAL
DIFFERENTIAL METHOD: ABNORMAL
EOSINOPHIL # BLD AUTO: 0.1 K/UL (ref 0–0.5)
EOSINOPHIL NFR BLD: 2 % (ref 0–8)
ERYTHROCYTE [DISTWIDTH] IN BLOOD BY AUTOMATED COUNT: 16.9 % (ref 11.5–14.5)
EST. GFR  (AFRICAN AMERICAN): >60 ML/MIN/1.73 M^2
EST. GFR  (NON AFRICAN AMERICAN): >60 ML/MIN/1.73 M^2
GLUCOSE SERPL-MCNC: 79 MG/DL (ref 70–110)
HCT VFR BLD AUTO: 22.8 % (ref 37–48.5)
HGB BLD-MCNC: 6.5 G/DL (ref 12–16)
LYMPHOCYTES # BLD AUTO: 1.4 K/UL (ref 1–4.8)
LYMPHOCYTES NFR BLD: 34.4 % (ref 18–48)
MAGNESIUM SERPL-MCNC: 1.6 MG/DL (ref 1.6–2.6)
MCH RBC QN AUTO: 19.8 PG (ref 27–31)
MCHC RBC AUTO-ENTMCNC: 28.5 G/DL (ref 32–36)
MCV RBC AUTO: 69 FL (ref 82–98)
MONOCYTES # BLD AUTO: 0.4 K/UL (ref 0.3–1)
MONOCYTES NFR BLD: 9.5 % (ref 4–15)
NEUTROPHILS # BLD AUTO: 2.2 K/UL (ref 1.8–7.7)
NEUTROPHILS NFR BLD: 54.1 % (ref 38–73)
OVALOCYTES BLD QL SMEAR: ABNORMAL
PHOSPHATE SERPL-MCNC: 3.4 MG/DL (ref 2.7–4.5)
PLATELET # BLD AUTO: 240 K/UL (ref 150–350)
PMV BLD AUTO: 9.2 FL (ref 9.2–12.9)
POIKILOCYTOSIS BLD QL SMEAR: SLIGHT
POTASSIUM SERPL-SCNC: 3.7 MMOL/L (ref 3.5–5.1)
RBC # BLD AUTO: 3.29 M/UL (ref 4–5.4)
SODIUM SERPL-SCNC: 139 MMOL/L (ref 136–145)
SPHEROCYTES BLD QL SMEAR: ABNORMAL
WBC # BLD AUTO: 4.01 K/UL (ref 3.9–12.7)

## 2019-07-21 PROCEDURE — 36415 COLL VENOUS BLD VENIPUNCTURE: CPT

## 2019-07-21 PROCEDURE — 63600175 PHARM REV CODE 636 W HCPCS: Performed by: COLON & RECTAL SURGERY

## 2019-07-21 PROCEDURE — 99024 POSTOP FOLLOW-UP VISIT: CPT | Mod: ,,, | Performed by: COLON & RECTAL SURGERY

## 2019-07-21 PROCEDURE — 11000001 HC ACUTE MED/SURG PRIVATE ROOM

## 2019-07-21 PROCEDURE — 25000003 PHARM REV CODE 250: Performed by: COLON & RECTAL SURGERY

## 2019-07-21 PROCEDURE — 99024 PR POST-OP FOLLOW-UP VISIT: ICD-10-PCS | Mod: ,,, | Performed by: COLON & RECTAL SURGERY

## 2019-07-21 PROCEDURE — 83735 ASSAY OF MAGNESIUM: CPT

## 2019-07-21 PROCEDURE — 80048 BASIC METABOLIC PNL TOTAL CA: CPT

## 2019-07-21 PROCEDURE — 85025 COMPLETE CBC W/AUTO DIFF WBC: CPT

## 2019-07-21 PROCEDURE — 84100 ASSAY OF PHOSPHORUS: CPT

## 2019-07-21 PROCEDURE — S0028 INJECTION, FAMOTIDINE, 20 MG: HCPCS | Performed by: COLON & RECTAL SURGERY

## 2019-07-21 RX ADMIN — OXYCODONE HYDROCHLORIDE 10 MG: 5 TABLET ORAL at 09:07

## 2019-07-21 RX ADMIN — SIMETHICONE CHEW TAB 80 MG 160 MG: 80 TABLET ORAL at 08:07

## 2019-07-21 RX ADMIN — OXYCODONE HYDROCHLORIDE 5 MG: 5 TABLET ORAL at 05:07

## 2019-07-21 RX ADMIN — SIMETHICONE CHEW TAB 80 MG 160 MG: 80 TABLET ORAL at 09:07

## 2019-07-21 RX ADMIN — DOCUSATE SODIUM 100 MG: 100 CAPSULE, LIQUID FILLED ORAL at 08:07

## 2019-07-21 RX ADMIN — SIMETHICONE CHEW TAB 80 MG 160 MG: 80 TABLET ORAL at 06:07

## 2019-07-21 RX ADMIN — ACETAMINOPHEN 650 MG: 325 TABLET ORAL at 05:07

## 2019-07-21 RX ADMIN — CHLORHEXIDINE GLUCONATE 0.12% ORAL RINSE 10 ML: 1.2 LIQUID ORAL at 08:07

## 2019-07-21 RX ADMIN — ENOXAPARIN SODIUM 40 MG: 100 INJECTION SUBCUTANEOUS at 05:07

## 2019-07-21 RX ADMIN — FAMOTIDINE 20 MG: 20 INJECTION, SOLUTION INTRAVENOUS at 08:07

## 2019-07-21 RX ADMIN — ONDANSETRON 4 MG: 2 INJECTION INTRAMUSCULAR; INTRAVENOUS at 05:07

## 2019-07-21 RX ADMIN — ACETAMINOPHEN 650 MG: 325 TABLET ORAL at 11:07

## 2019-07-21 RX ADMIN — OXYCODONE HYDROCHLORIDE 5 MG: 5 TABLET ORAL at 03:07

## 2019-07-21 RX ADMIN — OXYCODONE HYDROCHLORIDE 5 MG: 5 TABLET ORAL at 09:07

## 2019-07-21 RX ADMIN — SIMETHICONE CHEW TAB 80 MG 160 MG: 80 TABLET ORAL at 01:07

## 2019-07-21 RX ADMIN — OXYCODONE HYDROCHLORIDE 5 MG: 5 TABLET ORAL at 01:07

## 2019-07-21 NOTE — PLAN OF CARE
Problem: Adult Inpatient Plan of Care  Goal: Plan of Care Review  Outcome: Ongoing (interventions implemented as appropriate)  Remains injury free. Pain managed with oral meds. Ambulates in [place] without difficulty. Positive flatus. Dr Laurent aware of low H/H, no new orders.

## 2019-07-21 NOTE — ASSESSMENT & PLAN NOTE
SEE9puxmg, volvulus reduction and internal hernia repair, +flatus, no BM yet postop    - Cont reg diet as tolerates  - Cont simethicone  - Cont PO pain control  - KUB this AM, shows nonspecific bowel gas pattern without definitive abnormality  - IVF  - OOB, ambulation encouraged  - IS 10xs/hr while awake

## 2019-07-21 NOTE — NURSING
Intermittent nausea and abdominal fullness with abdominal pain has been persistent today. Treating with oxy  IR x1 tab is effective per pt , but lasts few hours . Encourage fluids , activity ad julien. Pt reports she's passing flatus. Poor appetite. No signs of bleeding. Midline surgical incision is intact, no redness, draining , it is tender with periwound swelling.   No chest pain, sob, no signs of fluid deficit. md Mehran is aware of patient's low h/h.

## 2019-07-21 NOTE — PLAN OF CARE
Problem: Adult Inpatient Plan of Care  Goal: Plan of Care Review  Outcome: Ongoing (interventions implemented as appropriate)  Pt had no adverse events during shift. Pt free of falls. Call light in reach. Side rails x 2. Pain well controlled w/ PRN meds. Pt repositions independently. IVF administered as ordered. Pt denies nausea. Passing flatus. Incision CDI. VSS. Chart reviewed, will continue to monitor.

## 2019-07-21 NOTE — SUBJECTIVE & OBJECTIVE
Interval History: Still passing flatus, no BM. Complaining of dull aching abdominal pain and some gas pains.     Medications:  Continuous Infusions:   sodium chloride 0.45% 50 mL/hr at 07/20/19 1118     Scheduled Meds:   acetaminophen  650 mg Oral Q6H    chlorhexidine  10 mL Mouth/Throat BID    docusate sodium  100 mg Oral BID    enoxaparin  40 mg Subcutaneous Daily    famotidine  20 mg Intravenous BID    nozaseptin   Each Nare BID    simethicone  2 tablet Oral QID (PC + HS)     PRN Meds:diphenhydrAMINE, morphine, naloxone, ondansetron, oxyCODONE, oxyCODONE, promethazine (PHENERGAN) IVPB, sodium chloride 0.9%, sodium chloride 0.9%     Review of patient's allergies indicates:  No Known Allergies  Objective:     Vital Signs (Most Recent):  Temp: 98.6 °F (37 °C) (07/21/19 0845)  Pulse: 77 (07/21/19 0845)  Resp: 16 (07/21/19 0845)  BP: 110/69 (07/21/19 0845)  SpO2: 98 % (07/21/19 0845) Vital Signs (24h Range):  Temp:  [97.8 °F (36.6 °C)-98.6 °F (37 °C)] 98.6 °F (37 °C)  Pulse:  [52-79] 77  Resp:  [16-18] 16  SpO2:  [97 %-100 %] 98 %  BP: (108-116)/(62-73) 110/69     Weight: 63 kg (138 lb 14.2 oz)  Body mass index is 21.75 kg/m².    Intake/Output - Last 3 Shifts       07/19 0700 - 07/20 0659 07/20 0700 - 07/21 0659 07/21 0700 - 07/22 0659    P.O. 240 540     I.V. (mL/kg) 3114.7 (49.4) 1693.9 (26.9)     IV Piggyback 100 100     Total Intake(mL/kg) 3454.7 (54.8) 2333.9 (37)     Urine (mL/kg/hr)       Drains       Total Output       Net +3454.7 +2333.9            Urine Occurrence 6 x 4 x           Physical Exam   Constitutional: She is oriented to person, place, and time. She appears well-developed.   HENT:   Head: Normocephalic and atraumatic.   Eyes: Conjunctivae and EOM are normal.   Neck: Normal range of motion. No thyromegaly present.   Cardiovascular: Normal rate and regular rhythm.   Pulmonary/Chest: Effort normal. No respiratory distress.   Abdominal:   Soft, nondistended, appropriately TTP; incision c/d/i  without erythema or drainage; no rebound or guarding   Musculoskeletal: Normal range of motion. She exhibits no edema or tenderness.   Neurological: She is alert and oriented to person, place, and time.   Skin: Skin is warm and dry. Capillary refill takes less than 2 seconds. No rash noted.   Psychiatric: She has a normal mood and affect.       Significant Labs:  CBC:   Recent Labs   Lab 07/21/19  0544   WBC 4.01   RBC 3.29*   HGB 6.5*   HCT 22.8*      MCV 69*   MCH 19.8*   MCHC 28.5*     BMP:   Recent Labs   Lab 07/21/19  0544   GLU 79      K 3.7      CO2 24   BUN 8   CREATININE 0.6   CALCIUM 8.5*   MG 1.6       Significant Diagnostics:  I have reviewed and interpreted all pertinent imaging results/findings within the past 24 hours.

## 2019-07-21 NOTE — PROGRESS NOTES
[facility] -   General Surgery  Progress Note    Subjective:     History of Present Illness:  Janett Murrell is a 39 y.o. female who is about 6 years s/p gastric bypass surgery which was done in [place] by Dr Broussard. She presented to the ED last night c/o epigastric abdominal pain that began 4-5 days ago and gradually worsened. The pain radiates across her abdomen, and is associated with abdominal distention, constipation, nausea and vomiting. She reports occasional mild intermittent symptoms occurring over the last year which resolve quickly. A CT showed dilated small bowel with a transition in the lower abdomen at the distal anastomosis. Cannot rule out small bowel volvulus. General Surgery was called for admission. An NG was placed.     Post-Op Info:  Procedure(s) (LRB):  LAPAROTOMY, EXPLORATORY (N/A)  REPAIR, HERNIA (N/A)   4 Days Post-Op     Interval History: Still passing flatus, no BM. Complaining of dull aching abdominal pain and some gas pains.     Medications:  Continuous Infusions:   sodium chloride 0.45% 50 mL/hr at 07/20/19 1118     Scheduled Meds:   acetaminophen  650 mg Oral Q6H    chlorhexidine  10 mL Mouth/Throat BID    docusate sodium  100 mg Oral BID    enoxaparin  40 mg Subcutaneous Daily    famotidine  20 mg Intravenous BID    nozaseptin   Each Nare BID    simethicone  2 tablet Oral QID (PC + HS)     PRN Meds:diphenhydrAMINE, morphine, naloxone, ondansetron, oxyCODONE, oxyCODONE, promethazine (PHENERGAN) IVPB, sodium chloride 0.9%, sodium chloride 0.9%     Review of patient's allergies indicates:  No Known Allergies  Objective:     Vital Signs (Most Recent):  Temp: 98.6 °F (37 °C) (07/21/19 0845)  Pulse: 77 (07/21/19 0845)  Resp: 16 (07/21/19 0845)  BP: 110/69 (07/21/19 0845)  SpO2: 98 % (07/21/19 0845) Vital Signs (24h Range):  Temp:  [97.8 °F (36.6 °C)-98.6 °F (37 °C)] 98.6 °F (37 °C)  Pulse:  [52-79] 77  Resp:  [16-18] 16  SpO2:  [97 %-100 %] 98 %  BP:  (108-116)/(62-73) 110/69     Weight: 63 kg (138 lb 14.2 oz)  Body mass index is 21.75 kg/m².    Intake/Output - Last 3 Shifts       07/19 0700 - 07/20 0659 07/20 0700 - 07/21 0659 07/21 0700 - 07/22 0659    P.O. 240 540     I.V. (mL/kg) 3114.7 (49.4) 1693.9 (26.9)     IV Piggyback 100 100     Total Intake(mL/kg) 3454.7 (54.8) 2333.9 (37)     Urine (mL/kg/hr)       Drains       Total Output       Net +3454.7 +2333.9            Urine Occurrence 6 x 4 x           Physical Exam   Constitutional: She is oriented to person, place, and time. She appears well-developed.   HENT:   Head: Normocephalic and atraumatic.   Eyes: Conjunctivae and EOM are normal.   Neck: Normal range of motion. No thyromegaly present.   Cardiovascular: Normal rate and regular rhythm.   Pulmonary/Chest: Effort normal. No respiratory distress.   Abdominal:   Soft, nondistended, appropriately TTP; incision c/d/i without erythema or drainage; no rebound or guarding   Musculoskeletal: Normal range of motion. She exhibits no edema or tenderness.   Neurological: She is alert and oriented to person, place, and time.   Skin: Skin is warm and dry. Capillary refill takes less than 2 seconds. No rash noted.   Psychiatric: She has a normal mood and affect.       Significant Labs:  CBC:   Recent Labs   Lab 07/21/19  0544   WBC 4.01   RBC 3.29*   HGB 6.5*   HCT 22.8*      MCV 69*   MCH 19.8*   MCHC 28.5*     BMP:   Recent Labs   Lab 07/21/19  0544   GLU 79      K 3.7      CO2 24   BUN 8   CREATININE 0.6   CALCIUM 8.5*   MG 1.6       Significant Diagnostics:  I have reviewed and interpreted all pertinent imaging results/findings within the past 24 hours.    Assessment/Plan:     * Internal hernia  [id number], volvulus reduction and internal hernia repair, +flatus, no BM yet postop    - Cont reg diet as tolerates  - Cont simethicone  - Cont PO pain control  - KUB this AM, shows nonspecific bowel gas pattern without definitive abnormality  - IVF  -  OOB, ambulation encouraged  - IS 10xs/hr while awake      Small bowel volvulus  See plan for internal hernia        José Antonio Laurent MD  General Surgery  [facility]

## 2019-07-22 LAB
ANION GAP SERPL CALC-SCNC: 8 MMOL/L (ref 8–16)
BASOPHILS # BLD AUTO: 0.02 K/UL (ref 0–0.2)
BASOPHILS NFR BLD: 0.4 % (ref 0–1.9)
BUN SERPL-MCNC: 7 MG/DL (ref 6–20)
CALCIUM SERPL-MCNC: 8.5 MG/DL (ref 8.7–10.5)
CHLORIDE SERPL-SCNC: 106 MMOL/L (ref 95–110)
CO2 SERPL-SCNC: 22 MMOL/L (ref 23–29)
CREAT SERPL-MCNC: 0.6 MG/DL (ref 0.5–1.4)
DACRYOCYTES BLD QL SMEAR: ABNORMAL
DIFFERENTIAL METHOD: ABNORMAL
EOSINOPHIL # BLD AUTO: 0.1 K/UL (ref 0–0.5)
EOSINOPHIL NFR BLD: 2.1 % (ref 0–8)
ERYTHROCYTE [DISTWIDTH] IN BLOOD BY AUTOMATED COUNT: 16.9 % (ref 11.5–14.5)
EST. GFR  (AFRICAN AMERICAN): >60 ML/MIN/1.73 M^2
EST. GFR  (NON AFRICAN AMERICAN): >60 ML/MIN/1.73 M^2
GLUCOSE SERPL-MCNC: 72 MG/DL (ref 70–110)
HCT VFR BLD AUTO: 25.2 % (ref 37–48.5)
HGB BLD-MCNC: 7.3 G/DL (ref 12–16)
HYPOCHROMIA BLD QL SMEAR: ABNORMAL
LYMPHOCYTES # BLD AUTO: 1.5 K/UL (ref 1–4.8)
LYMPHOCYTES NFR BLD: 30.2 % (ref 18–48)
MAGNESIUM SERPL-MCNC: 1.5 MG/DL (ref 1.6–2.6)
MCH RBC QN AUTO: 19.9 PG (ref 27–31)
MCHC RBC AUTO-ENTMCNC: 29 G/DL (ref 32–36)
MCV RBC AUTO: 69 FL (ref 82–98)
MONOCYTES # BLD AUTO: 0.5 K/UL (ref 0.3–1)
MONOCYTES NFR BLD: 9.3 % (ref 4–15)
NEUTROPHILS # BLD AUTO: 2.8 K/UL (ref 1.8–7.7)
NEUTROPHILS NFR BLD: 58.2 % (ref 38–73)
OVALOCYTES BLD QL SMEAR: ABNORMAL
PHOSPHATE SERPL-MCNC: 3.6 MG/DL (ref 2.7–4.5)
PLATELET # BLD AUTO: 254 K/UL (ref 150–350)
PMV BLD AUTO: 9 FL (ref 9.2–12.9)
POIKILOCYTOSIS BLD QL SMEAR: SLIGHT
POTASSIUM SERPL-SCNC: 4 MMOL/L (ref 3.5–5.1)
RBC # BLD AUTO: 3.66 M/UL (ref 4–5.4)
SODIUM SERPL-SCNC: 136 MMOL/L (ref 136–145)
WBC # BLD AUTO: 4.86 K/UL (ref 3.9–12.7)

## 2019-07-22 PROCEDURE — 25000003 PHARM REV CODE 250: Performed by: COLON & RECTAL SURGERY

## 2019-07-22 PROCEDURE — 63600175 PHARM REV CODE 636 W HCPCS: Performed by: COLON & RECTAL SURGERY

## 2019-07-22 PROCEDURE — 25500020 PHARM REV CODE 255: Performed by: COLON & RECTAL SURGERY

## 2019-07-22 PROCEDURE — 99024 POSTOP FOLLOW-UP VISIT: CPT | Mod: ,,, | Performed by: COLON & RECTAL SURGERY

## 2019-07-22 PROCEDURE — 85025 COMPLETE CBC W/AUTO DIFF WBC: CPT

## 2019-07-22 PROCEDURE — 99024 PR POST-OP FOLLOW-UP VISIT: ICD-10-PCS | Mod: ,,, | Performed by: COLON & RECTAL SURGERY

## 2019-07-22 PROCEDURE — 80048 BASIC METABOLIC PNL TOTAL CA: CPT

## 2019-07-22 PROCEDURE — 83735 ASSAY OF MAGNESIUM: CPT

## 2019-07-22 PROCEDURE — 11000001 HC ACUTE MED/SURG PRIVATE ROOM

## 2019-07-22 PROCEDURE — 36415 COLL VENOUS BLD VENIPUNCTURE: CPT

## 2019-07-22 PROCEDURE — 84100 ASSAY OF PHOSPHORUS: CPT

## 2019-07-22 RX ORDER — FAMOTIDINE 20 MG/1
20 TABLET, FILM COATED ORAL 2 TIMES DAILY
Status: DISCONTINUED | OUTPATIENT
Start: 2019-07-22 | End: 2019-07-23 | Stop reason: HOSPADM

## 2019-07-22 RX ADMIN — DOCUSATE SODIUM 100 MG: 100 CAPSULE, LIQUID FILLED ORAL at 08:07

## 2019-07-22 RX ADMIN — IOHEXOL 75 ML: 350 INJECTION, SOLUTION INTRAVENOUS at 01:07

## 2019-07-22 RX ADMIN — ENOXAPARIN SODIUM 40 MG: 100 INJECTION SUBCUTANEOUS at 05:07

## 2019-07-22 RX ADMIN — OXYCODONE HYDROCHLORIDE 5 MG: 5 TABLET ORAL at 03:07

## 2019-07-22 RX ADMIN — SIMETHICONE CHEW TAB 80 MG 160 MG: 80 TABLET ORAL at 09:07

## 2019-07-22 RX ADMIN — FAMOTIDINE 20 MG: 20 TABLET, FILM COATED ORAL at 08:07

## 2019-07-22 RX ADMIN — SIMETHICONE CHEW TAB 80 MG 160 MG: 80 TABLET ORAL at 04:07

## 2019-07-22 RX ADMIN — ACETAMINOPHEN 650 MG: 325 TABLET ORAL at 12:07

## 2019-07-22 RX ADMIN — CHLORHEXIDINE GLUCONATE 0.12% ORAL RINSE 10 ML: 1.2 LIQUID ORAL at 08:07

## 2019-07-22 RX ADMIN — ACETAMINOPHEN 650 MG: 325 TABLET ORAL at 05:07

## 2019-07-22 RX ADMIN — SIMETHICONE CHEW TAB 80 MG 160 MG: 80 TABLET ORAL at 06:07

## 2019-07-22 RX ADMIN — SODIUM CHLORIDE: 0.45 INJECTION, SOLUTION INTRAVENOUS at 01:07

## 2019-07-22 RX ADMIN — SIMETHICONE CHEW TAB 80 MG 160 MG: 80 TABLET ORAL at 08:07

## 2019-07-22 RX ADMIN — IOHEXOL 30 ML: 350 INJECTION, SOLUTION INTRAVENOUS at 12:07

## 2019-07-22 RX ADMIN — OXYCODONE HYDROCHLORIDE 10 MG: 5 TABLET ORAL at 05:07

## 2019-07-22 RX ADMIN — OXYCODONE HYDROCHLORIDE 5 MG: 5 TABLET ORAL at 09:07

## 2019-07-22 RX ADMIN — ONDANSETRON 4 MG: 2 INJECTION INTRAMUSCULAR; INTRAVENOUS at 09:07

## 2019-07-22 NOTE — ASSESSMENT & PLAN NOTE
POD5 s/p exlap, volvulus reduction and internal hernia repair, +flatus, no BM yet postop    - Will obtain CT abd/pelvis with PO and IV contrast today given increased distention and inability to pass BM postop with continued eddy  - Cont reg diet as tolerates  - Cont simethicone  - Cont PO pain control  - IVF  - OOB, ambulation encouraged  - IS 10xs/hr while awake

## 2019-07-22 NOTE — PROGRESS NOTES
Pharmacist Intervention IV to PO Note    Janett Murrell is a 39 y.o. female being treated with IV medication famotidine    Patient Data:    Vital Signs (Most Recent):  Temp: 98.1 °F (36.7 °C) (07/22/19 0334)  Pulse: (!) 55 (07/22/19 0334)  Resp: 18 (07/22/19 0334)  BP: 118/76 (07/22/19 0334)  SpO2: 99 % (07/22/19 0334)   Vital Signs (72h Range):  Temp:  [97.8 °F (36.6 °C)-98.6 °F (37 °C)]   Pulse:  [52-80]   Resp:  [11-18]   BP: (102-124)/(54-82)   SpO2:  [97 %-100 %]      CBC:  Recent Labs   Lab 07/19/19  0528 07/20/19  0529 07/21/19  0544   WBC 7.14 5.17 4.01   RBC 3.60* 3.50* 3.29*   HGB 7.1* 7.0* 6.5*   HCT 25.1* 24.4* 22.8*    220 240   MCV 70* 70* 69*   MCH 19.7* 20.0* 19.8*   MCHC 28.3* 28.7* 28.5*     CMP:     Recent Labs   Lab 07/17/19  0110  07/19/19  0528 07/20/19  0529 07/21/19  0544   GLU 87   < > 74 74 79   CALCIUM 9.7   < > 8.8 8.9 8.5*   ALBUMIN 4.1  --   --   --   --    PROT 7.3  --   --   --   --       < > 138 138 139   K 4.0   < > 3.7 3.6 3.7   CO2 23   < > 22* 24 24      < > 105 104 106   BUN 12   < > 7 5* 8   CREATININE 0.8   < > 0.6 0.6 0.6   ALKPHOS 83  --   --   --   --    ALT 13  --   --   --   --    AST 23  --   --   --   --    BILITOT 0.6  --   --   --   --     < > = values in this interval not displayed.       Dietary Orders:  Diet Orders            Diet Adult Regular (IDDSI Level 7): Regular starting at 07/20 1000            Based on the following criteria, this patient qualifies for intravenous to oral conversion:  [x] The patients gastrointestinal tract is functioning (tolerating medications via oral or enteral route for 24 hours and tolerating food or enteral feeds for 24 hours).  [x] The patient is hemodynamically stable for 24 hours (heart rate <100 beats per minute, systolic blood pressure >99 mm Hg, and respiratory rate <20 breaths per minute).  [x] The patient shows clinical improvement (afebrile for at least 24 hours and white blood cell count  downtrending or normalized). Additionally, the patient must be non-neutropenic (absolute neutrophil count >500 cells/mm3).  [x] For antimicrobials, the patient has received IV therapy for at least 24 hours.    IV medication will be changed to oral medication : famotidine 20mg twice a day    Pharmacist's Name: Ramiro Wells  Pharmacist's Extension: [phone]

## 2019-07-22 NOTE — PROGRESS NOTES
[facility] -   General Surgery  Progress Note    Subjective:     History of Present Illness:  Janett Murrell is a 39 y.o. female who is about 6 years s/p gastric bypass surgery which was done in [place] by Dr Broussard. She presented to the ED last night c/o epigastric abdominal pain that began 4-5 days ago and gradually worsened. The pain radiates across her abdomen, and is associated with abdominal distention, constipation, nausea and vomiting. She reports occasional mild intermittent symptoms occurring over the last year which resolve quickly. A CT showed dilated small bowel with a transition in the lower abdomen at the distal anastomosis. Cannot rule out small bowel volvulus. General Surgery was called for admission. An NG was placed.     Post-Op Info:  Procedure(s) (LRB):  LAPAROTOMY, EXPLORATORY (N/A)  REPAIR, HERNIA (N/A)   5 Days Post-Op     Interval History: Continues passing flatus, no BM. Some abdominal distention with migrating pain. +nausea yesterday, no emesis.    Medications:  Continuous Infusions:   sodium chloride 0.45% 50 mL/hr at 07/21/19 1500     Scheduled Meds:   acetaminophen  650 mg Oral Q6H    docusate sodium  100 mg Oral BID    enoxaparin  40 mg Subcutaneous Daily    famotidine  20 mg Oral BID    iohexol  30 mL Oral Once    nozaseptin   Each Nare BID    simethicone  2 tablet Oral QID (PC + HS)     PRN Meds:diphenhydrAMINE, morphine, naloxone, ondansetron, oxyCODONE, oxyCODONE, promethazine (PHENERGAN) IVPB, sodium chloride 0.9%, sodium chloride 0.9%     Review of patient's allergies indicates:  No Known Allergies  Objective:     Vital Signs (Most Recent):  Temp: 98.7 °F (37.1 °C) (07/22/19 0808)  Pulse: (!) 55 (07/22/19 0808)  Resp: 14 (07/22/19 0808)  BP: 131/84 (07/22/19 0808)  SpO2: 100 % (07/22/19 0808) Vital Signs (24h Range):  Temp:  [98 °F (36.7 °C)-98.7 °F (37.1 °C)] 98.7 °F (37.1 °C)  Pulse:  [55-58] 55  Resp:  [14-18] 14  SpO2:  [99 %-100 %] 100  %  BP: (118-131)/(76-84) 131/84     Weight: 63 kg (138 lb 14.2 oz)  Body mass index is 21.75 kg/m².    Intake/Output - Last 3 Shifts       07/20 0700 - 07/21 0659 07/21 0700 - 07/22 0659 07/22 0700 - 07/23 0659    P.O. 540 480     I.V. (mL/kg) 1693.9 (26.9) 1150 (18.3)     IV Piggyback 100 100     Total Intake(mL/kg) 2333.9 (37) 1730 (27.5)     Net +2333.9 +1730            Urine Occurrence 4 x 3 x           Physical Exam   Constitutional: She is oriented to person, place, and time. She appears well-developed.   HENT:   Head: Normocephalic and atraumatic.   Eyes: Conjunctivae and EOM are normal.   Neck: Normal range of motion. No thyromegaly present.   Cardiovascular: Normal rate and regular rhythm.   Pulmonary/Chest: Effort normal. No respiratory distress.   Abdominal:   Soft, mild distention with mild global tympany, appropriately TTP; incision c/d/i without erythema or drainage; no rebound or guarding   Musculoskeletal: Normal range of motion. She exhibits no edema or tenderness.   Neurological: She is alert and oriented to person, place, and time.   Skin: Skin is warm and dry. Capillary refill takes less than 2 seconds. No rash noted.   Psychiatric: She has a normal mood and affect.       Significant Labs:  CBC:   Recent Labs   Lab 07/22/19  0543   WBC 4.86   RBC 3.66*   HGB 7.3*   HCT 25.2*      MCV 69*   MCH 19.9*   MCHC 29.0*     BMP:   Recent Labs   Lab 07/22/19  0543   GLU 72      K 4.0      CO2 22*   BUN 7   CREATININE 0.6   CALCIUM 8.5*   MG 1.5*     CMP:   Recent Labs   Lab 07/17/19  0110  07/22/19  0543   GLU 87   < > 72   CALCIUM 9.7   < > 8.5*   ALBUMIN 4.1  --   --    PROT 7.3  --   --       < > 136   K 4.0   < > 4.0   CO2 23   < > 22*      < > 106   BUN 12   < > 7   CREATININE 0.8   < > 0.6   ALKPHOS 83  --   --    ALT 13  --   --    AST 23  --   --    BILITOT 0.6  --   --     < > = values in this interval not displayed.     LFTs:   Recent Labs   Lab 07/17/19  0114    ALT 13   AST 23   ALKPHOS 83   BILITOT 0.6   PROT 7.3   ALBUMIN 4.1       Significant Diagnostics:  I have reviewed all pertinent imaging results/findings within the past 24 hours.    Assessment/Plan:     * Internal hernia  POD5 s/p exlap, volvulus reduction and internal hernia repair, +flatus, no BM yet postop    - Will obtain CT abd/pelvis with PO and IV contrast today given increased distention and inability to pass BM postop with continued eddy  - Cont reg diet as tolerates  - Cont simethicone  - Cont PO pain control  - IVF  - OOB, ambulation encouraged  - IS 10xs/hr while awake      Small bowel volvulus  See plan for internal hernia        José Antonio Laurent MD  General Surgery  [facility]

## 2019-07-22 NOTE — SUBJECTIVE & OBJECTIVE
Interval History: Continues passing flatus, no BM. Some abdominal distention with migrating pain. +nausea yesterday, no emesis.    Medications:  Continuous Infusions:   sodium chloride 0.45% 50 mL/hr at 07/21/19 1500     Scheduled Meds:   acetaminophen  650 mg Oral Q6H    docusate sodium  100 mg Oral BID    enoxaparin  40 mg Subcutaneous Daily    famotidine  20 mg Oral BID    iohexol  30 mL Oral Once    nozaseptin   Each Nare BID    simethicone  2 tablet Oral QID (PC + HS)     PRN Meds:diphenhydrAMINE, morphine, naloxone, ondansetron, oxyCODONE, oxyCODONE, promethazine (PHENERGAN) IVPB, sodium chloride 0.9%, sodium chloride 0.9%     Review of patient's allergies indicates:  No Known Allergies  Objective:     Vital Signs (Most Recent):  Temp: 98.7 °F (37.1 °C) (07/22/19 0808)  Pulse: (!) 55 (07/22/19 0808)  Resp: 14 (07/22/19 0808)  BP: 131/84 (07/22/19 0808)  SpO2: 100 % (07/22/19 0808) Vital Signs (24h Range):  Temp:  [98 °F (36.7 °C)-98.7 °F (37.1 °C)] 98.7 °F (37.1 °C)  Pulse:  [55-58] 55  Resp:  [14-18] 14  SpO2:  [99 %-100 %] 100 %  BP: (118-131)/(76-84) 131/84     Weight: 63 kg (138 lb 14.2 oz)  Body mass index is 21.75 kg/m².    Intake/Output - Last 3 Shifts       07/20 0700 - 07/21 0659 07/21 0700 - 07/22 0659 07/22 0700 - 07/23 0659    P.O. 540 480     I.V. (mL/kg) 1693.9 (26.9) 1150 (18.3)     IV Piggyback 100 100     Total Intake(mL/kg) 2333.9 (37) 1730 (27.5)     Net +2333.9 +1730            Urine Occurrence 4 x 3 x           Physical Exam   Constitutional: She is oriented to person, place, and time. She appears well-developed.   HENT:   Head: Normocephalic and atraumatic.   Eyes: Conjunctivae and EOM are normal.   Neck: Normal range of motion. No thyromegaly present.   Cardiovascular: Normal rate and regular rhythm.   Pulmonary/Chest: Effort normal. No respiratory distress.   Abdominal:   Soft, mild distention with mild global tympany, appropriately TTP; incision c/d/i without erythema or  drainage; no rebound or guarding   Musculoskeletal: Normal range of motion. She exhibits no edema or tenderness.   Neurological: She is alert and oriented to person, place, and time.   Skin: Skin is warm and dry. Capillary refill takes less than 2 seconds. No rash noted.   Psychiatric: She has a normal mood and affect.       Significant Labs:  CBC:   Recent Labs   Lab 07/22/19 0543   WBC 4.86   RBC 3.66*   HGB 7.3*   HCT 25.2*      MCV 69*   MCH 19.9*   MCHC 29.0*     BMP:   Recent Labs   Lab 07/22/19  0543   GLU 72      K 4.0      CO2 22*   BUN 7   CREATININE 0.6   CALCIUM 8.5*   MG 1.5*     CMP:   Recent Labs   Lab 07/17/19 0110 07/22/19  0543   GLU 87   < > 72   CALCIUM 9.7   < > 8.5*   ALBUMIN 4.1  --   --    PROT 7.3  --   --       < > 136   K 4.0   < > 4.0   CO2 23   < > 22*      < > 106   BUN 12   < > 7   CREATININE 0.8   < > 0.6   ALKPHOS 83  --   --    ALT 13  --   --    AST 23  --   --    BILITOT 0.6  --   --     < > = values in this interval not displayed.     LFTs:   Recent Labs   Lab 07/17/19  0110   ALT 13   AST 23   ALKPHOS 83   BILITOT 0.6   PROT 7.3   ALBUMIN 4.1       Significant Diagnostics:  I have reviewed all pertinent imaging results/findings within the past 24 hours.

## 2019-07-22 NOTE — PLAN OF CARE
Problem: Adult Inpatient Plan of Care  Goal: Plan of Care Review  Outcome: Ongoing (interventions implemented as appropriate)  Patient remains free of falls and injuries. Pain controlled by oral pain meds. Incision clean dry and intact. IV fluids provided. Chart check complete. Will continue to monitor.

## 2019-07-22 NOTE — PLAN OF CARE
CM met with patient at the bedside.  No discharge needs at this time.     07/22/19 1530   Discharge Reassessment   Assessment Type Discharge Planning Reassessment   Provided patient/caregiver education on the expected discharge date and the discharge plan Yes   Do you have any problems affording any of your prescribed medications? No   Discharge Plan A Home   DME Needed Upon Discharge  none   Patient choice form signed by patient/caregiver N/A   Anticipated Discharge Disposition Home   Can the patient answer the patient profile reliably? Yes, cognitively intact

## 2019-07-23 VITALS
BODY MASS INDEX: 21.8 KG/M2 | RESPIRATION RATE: 18 BRPM | OXYGEN SATURATION: 95 % | HEIGHT: 67 IN | WEIGHT: 138.88 LBS | TEMPERATURE: 98 F | DIASTOLIC BLOOD PRESSURE: 75 MMHG | HEART RATE: 84 BPM | SYSTOLIC BLOOD PRESSURE: 116 MMHG

## 2019-07-23 LAB
ANION GAP SERPL CALC-SCNC: 7 MMOL/L (ref 8–16)
BASOPHILS # BLD AUTO: 0.01 K/UL (ref 0–0.2)
BASOPHILS NFR BLD: 0.2 % (ref 0–1.9)
BUN SERPL-MCNC: 7 MG/DL (ref 6–20)
CALCIUM SERPL-MCNC: 8.2 MG/DL (ref 8.7–10.5)
CHLORIDE SERPL-SCNC: 106 MMOL/L (ref 95–110)
CO2 SERPL-SCNC: 25 MMOL/L (ref 23–29)
CREAT SERPL-MCNC: 0.6 MG/DL (ref 0.5–1.4)
DIFFERENTIAL METHOD: ABNORMAL
EOSINOPHIL # BLD AUTO: 0.1 K/UL (ref 0–0.5)
EOSINOPHIL NFR BLD: 2.8 % (ref 0–8)
ERYTHROCYTE [DISTWIDTH] IN BLOOD BY AUTOMATED COUNT: 16.9 % (ref 11.5–14.5)
EST. GFR  (AFRICAN AMERICAN): >60 ML/MIN/1.73 M^2
EST. GFR  (NON AFRICAN AMERICAN): >60 ML/MIN/1.73 M^2
GLUCOSE SERPL-MCNC: 75 MG/DL (ref 70–110)
HCT VFR BLD AUTO: 23.5 % (ref 37–48.5)
HGB BLD-MCNC: 7 G/DL (ref 12–16)
LYMPHOCYTES # BLD AUTO: 1.3 K/UL (ref 1–4.8)
LYMPHOCYTES NFR BLD: 26.9 % (ref 18–48)
MAGNESIUM SERPL-MCNC: 1.5 MG/DL (ref 1.6–2.6)
MCH RBC QN AUTO: 20.3 PG (ref 27–31)
MCHC RBC AUTO-ENTMCNC: 29.8 G/DL (ref 32–36)
MCV RBC AUTO: 68 FL (ref 82–98)
MONOCYTES # BLD AUTO: 0.5 K/UL (ref 0.3–1)
MONOCYTES NFR BLD: 11.2 % (ref 4–15)
NEUTROPHILS # BLD AUTO: 2.7 K/UL (ref 1.8–7.7)
NEUTROPHILS NFR BLD: 58.9 % (ref 38–73)
PHOSPHATE SERPL-MCNC: 3.7 MG/DL (ref 2.7–4.5)
PLATELET # BLD AUTO: 229 K/UL (ref 150–350)
PMV BLD AUTO: 8.5 FL (ref 9.2–12.9)
POTASSIUM SERPL-SCNC: 4.2 MMOL/L (ref 3.5–5.1)
RBC # BLD AUTO: 3.44 M/UL (ref 4–5.4)
SODIUM SERPL-SCNC: 138 MMOL/L (ref 136–145)
WBC # BLD AUTO: 4.64 K/UL (ref 3.9–12.7)

## 2019-07-23 PROCEDURE — 25000003 PHARM REV CODE 250: Performed by: COLON & RECTAL SURGERY

## 2019-07-23 PROCEDURE — 80048 BASIC METABOLIC PNL TOTAL CA: CPT

## 2019-07-23 PROCEDURE — 83735 ASSAY OF MAGNESIUM: CPT

## 2019-07-23 PROCEDURE — 99024 POSTOP FOLLOW-UP VISIT: CPT | Mod: ,,, | Performed by: COLON & RECTAL SURGERY

## 2019-07-23 PROCEDURE — 99024 PR POST-OP FOLLOW-UP VISIT: ICD-10-PCS | Mod: ,,, | Performed by: COLON & RECTAL SURGERY

## 2019-07-23 PROCEDURE — 36415 COLL VENOUS BLD VENIPUNCTURE: CPT

## 2019-07-23 PROCEDURE — 85025 COMPLETE CBC W/AUTO DIFF WBC: CPT

## 2019-07-23 PROCEDURE — 84100 ASSAY OF PHOSPHORUS: CPT

## 2019-07-23 RX ORDER — DOCUSATE SODIUM 100 MG/1
100 CAPSULE, LIQUID FILLED ORAL 2 TIMES DAILY
Qty: 60 CAPSULE | Refills: 0 | Status: SHIPPED | OUTPATIENT
Start: 2019-07-23

## 2019-07-23 RX ORDER — OXYCODONE HYDROCHLORIDE 5 MG/1
5 TABLET ORAL EVERY 6 HOURS PRN
Qty: 20 TABLET | Refills: 0 | Status: SHIPPED | OUTPATIENT
Start: 2019-07-23

## 2019-07-23 RX ORDER — ACETAMINOPHEN 325 MG/1
650 TABLET ORAL EVERY 6 HOURS
Refills: 0 | COMMUNITY
Start: 2019-07-23

## 2019-07-23 RX ORDER — SIMETHICONE 80 MG
160 TABLET,CHEWABLE ORAL
Refills: 0 | COMMUNITY
Start: 2019-07-23 | End: 2019-08-05

## 2019-07-23 RX ADMIN — DOCUSATE SODIUM 100 MG: 100 CAPSULE, LIQUID FILLED ORAL at 08:07

## 2019-07-23 RX ADMIN — FAMOTIDINE 20 MG: 20 TABLET, FILM COATED ORAL at 08:07

## 2019-07-23 RX ADMIN — ACETAMINOPHEN 650 MG: 325 TABLET ORAL at 12:07

## 2019-07-23 RX ADMIN — ACETAMINOPHEN 650 MG: 325 TABLET ORAL at 05:07

## 2019-07-23 RX ADMIN — SIMETHICONE CHEW TAB 80 MG 160 MG: 80 TABLET ORAL at 08:07

## 2019-07-23 RX ADMIN — SIMETHICONE CHEW TAB 80 MG 160 MG: 80 TABLET ORAL at 01:07

## 2019-07-23 NOTE — NURSING
Pt iaccidentally removed IV. Refusing a new one to be placed at this time. She would like to wait and see if she will be going home first.

## 2019-07-23 NOTE — PROGRESS NOTES
[facility] -   General Surgery  Progress Note    Subjective:     History of Present Illness:  Janett Murrell is a 39 y.o. female who is about 6 years s/p gastric bypass surgery which was done in [place] by Dr Broussard. She presented to the ED last night c/o epigastric abdominal pain that began 4-5 days ago and gradually worsened. The pain radiates across her abdomen, and is associated with abdominal distention, constipation, nausea and vomiting. She reports occasional mild intermittent symptoms occurring over the last year which resolve quickly. A CT showed dilated small bowel with a transition in the lower abdomen at the distal anastomosis. Cannot rule out small bowel volvulus. General Surgery was called for admission. An NG was placed.     Post-Op Info:  Procedure(s) (LRB):  LAPAROTOMY, EXPLORATORY (N/A)  REPAIR, HERNIA (N/A)   6 Days Post-Op     Interval History:  CT scan yesterday without any obstruction or major abnormality.  Continues passing flatus but no bowel movement.  Tolerating diet.    Medications:  Continuous Infusions:   sodium chloride 0.45% 50 mL/hr at 07/22/19 1355     Scheduled Meds:   acetaminophen  650 mg Oral Q6H    docusate sodium  100 mg Oral BID    enoxaparin  40 mg Subcutaneous Daily    famotidine  20 mg Oral BID    nozaseptin   Each Nare BID    simethicone  2 tablet Oral QID (PC + HS)     PRN Meds:diphenhydrAMINE, morphine, naloxone, ondansetron, oxyCODONE, oxyCODONE, promethazine (PHENERGAN) IVPB, sodium chloride 0.9%, sodium chloride 0.9%     Review of patient's allergies indicates:  No Known Allergies  Objective:     Vital Signs (Most Recent):  Temp: 98.3 °F (36.8 °C) (07/23/19 0803)  Pulse: 84 (07/23/19 0803)  Resp: 18 (07/23/19 0803)  BP: 116/75 (07/23/19 0803)  SpO2: 95 % (07/23/19 0803) Vital Signs (24h Range):  Temp:  [97.9 °F (36.6 °C)-99.1 °F (37.3 °C)] 98.3 °F (36.8 °C)  Pulse:  [55-84] 84  Resp:  [18] 18  SpO2:  [95 %-100 %] 95 %  BP:  (109-132)/(61-78) 116/75     Weight: 63 kg (138 lb 14.2 oz)  Body mass index is 21.75 kg/m².    Intake/Output - Last 3 Shifts       07/21 0700 - 07/22 0659 07/22 0700 - 07/23 0659 07/23 0700 - 07/24 0659    P.O. 480 1200     I.V. (mL/kg) 1150 (18.3) 650 (10.3)     IV Piggyback 100      Total Intake(mL/kg) 1730 (27.5) 1850 (29.4)     Net +1730 +1850            Urine Occurrence 3 x 6 x           Physical Exam   Constitutional: She is oriented to person, place, and time. She appears well-developed.   HENT:   Head: Normocephalic and atraumatic.   Eyes: Conjunctivae and EOM are normal.   Neck: Normal range of motion. No thyromegaly present.   Cardiovascular: Normal rate and regular rhythm.   Pulmonary/Chest: Effort normal. No respiratory distress.   Abdominal:   Soft, mild distention with mild global tympany, appropriately TTP; incision c/d/i without erythema or drainage; no rebound or guarding   Musculoskeletal: Normal range of motion. She exhibits no edema or tenderness.   Neurological: She is alert and oriented to person, place, and time.   Skin: Skin is warm and dry. Capillary refill takes less than 2 seconds. No rash noted.   Psychiatric: She has a normal mood and affect.       Significant Labs:  CBC:   Recent Labs   Lab 07/23/19  0551   WBC 4.64   RBC 3.44*   HGB 7.0*   HCT 23.5*      MCV 68*   MCH 20.3*   MCHC 29.8*     BMP:   Recent Labs   Lab 07/23/19  0551   GLU 75      K 4.2      CO2 25   BUN 7   CREATININE 0.6   CALCIUM 8.2*   MG 1.5*       Significant Diagnostics:  CT: I have reviewed all pertinent results/findings within the past 24 hours and my personal findings are:  No obstruction or evidence of recurrent internal hernia or volvulus     FINDINGS:  Heart: Normal size. No effusion.    Lung Bases: Small bilateral pleural effusions, right greater than left.  Patchy infiltrate right lower lobe.    Liver: Normal size and attenuation. No focal lesions.  Portal vein is patent.  Mild  intrahepatic ductal dilatation.  Mild prominence of common bile duct.  Minimal ascites is seen around the liver.    Gallbladder: Surgically absent.    Bile Ducts: Mild intrahepatic and common bile duct dilatation.  Common bile duct measures 9 mm.    Pancreas: No mass. No peripancreatic fat stranding.    Spleen: Mild splenomegaly with complex cyst along the superolateral margin measuring 2.7 x 2.1 cm.    Adrenals: Normal.    Kidneys/Ureters: Normal enhancement.  Several benign splenic cysts are suspected.  13 mm fatty lesion within the interpolar region left kidney is consistent with an angiomyolipoma.  No mass or  hydroureteronephrosis.    Bladder: No wall thickening.    Reproductive organs: IUD is seen within the uterus.    GI Tract/Mesentery: Postoperative changes are seen within the stomach.  No evidence of bowel obstruction.  Mild mucosal thickening is seen within small bowel loops within the left hemiabdomen and mild prominence within the proximal jejunum likely reflecting postoperative changes and mild ileus.  No obstruction is seen.  Moderate constipation.  Mild mesenteric edema.    Peritoneal Space: Moderate amount of free fluid is seen scattered throughout the pelvis.    Retroperitoneum: No significant adenopathy.    Abdominal wall: Mild body wall edema.  Midline scar is seen.  Air is seen within the anterior abdominal wall thought to be postoperative in nature.    Vasculature: No aneurysm.    Bones: No acute fracture. No suspicious lytic or sclerotic lesions.      Impression       Mild mucosal thickening is seen within small bowel loops within the left hemiabdomen and mild prominence within the proximal jejunum likely reflecting postoperative changes and mild ileus.  No evidence of obstruction or volvulus.    Moderate ascites.         Assessment/Plan:     * Internal hernia  POD6 s/p exlap, volvulus reduction and internal hernia repair, +flatus, no BM yet postop    - CT scan without obstruction or recurrent  volvulus/internal hernia yesterday  - Enema today to see if constipation can be relieved with this  - Cont reg diet as tolerates  - Cont simethicone  - Cont PO pain control  - IVF  - OOB, ambulation encouraged  - IS 10xs/hr while awake      Small bowel volvulus  See plan for internal hernia        José Antonio Laurent MD  General Surgery  [facility]

## 2019-07-23 NOTE — DISCHARGE SUMMARY
[facility] -   General Surgery  Discharge Summary      Patient Name: Janett Murrell  MRN: [id number]  Admission Date: 7/17/2019  Hospital Length of Stay: 6 days  Discharge Date and Time:  07/23/2019 4:47 PM  Attending Physician: José Antonio Laurent MD   Discharging Provider: José Antonio Laurent MD  Primary Care Provider: Provider [facility]    HPI:   Janett Murrell is a 39 y.o. female who is about 6 years s/p gastric bypass surgery which was done in [place] by Dr Broussard. She presented to the ED last night c/o epigastric abdominal pain that began 4-5 days ago and gradually worsened. The pain radiates across her abdomen, and is associated with abdominal distention, constipation, nausea and vomiting. She reports occasional mild intermittent symptoms occurring over the last year which resolve quickly. A CT showed dilated small bowel with a transition in the lower abdomen at the distal anastomosis. Cannot rule out small bowel volvulus. General Surgery was called for admission. An NG was placed.     Procedure(s) (LRB):  LAPAROTOMY, EXPLORATORY (N/A)  REPAIR, HERNIA (N/A)      Indwelling Lines/Drains at time of discharge:   Lines/Drains/Airways          None        Hospital Course: 07/17/2019: Taken to OR for exlap, SB volvulus reduction and internal hernia repair    07/18/2019: POD1. Pain well controlled. Some intermittent nausea despite NGT to LWS. Voiding spontaneously. No BM/flatus postop yet.    07/19/2019: POD2 pain controlled. She is hungry. No nausea. No bm/flatus.     07/20/2019: POD3: Pain well controlled. NGT removed yesterday and grayson CLD well. Some gas pains per patient. Passing flatus, no BM.     07/21/2019: POD4: Still passing flatus, no BM. Complaining of dull aching abdominal pain and some gas pains.     07/22/2019: POD5: Continues passing flatus, no BM. Some abdominal distention with migrating pain. +nausea yesterday, no emesis.    07/23/2019: POD6:  CT scan yesterday  without any obstruction or major abnormality.  Tolerating diet without nausea or vomiting.  Abdominal distention and pain improved.  Passing flatus and now passing bowel movements with the aid of enemas.  Requesting to go home and is fit for discharge.    Consults:     Significant Diagnostic Studies: Labs:   BMP:   Recent Labs   Lab 07/22/19  0543 07/23/19  0551   GLU 72 75    138   K 4.0 4.2    106   CO2 22* 25   BUN 7 7   CREATININE 0.6 0.6   CALCIUM 8.5* 8.2*   MG 1.5* 1.5*   , CMP   Recent Labs   Lab 07/22/19  0543 07/23/19  0551    138   K 4.0 4.2    106   CO2 22* 25   GLU 72 75   BUN 7 7   CREATININE 0.6 0.6   CALCIUM 8.5* 8.2*   ANIONGAP 8 7*   ESTGFRAFRICA >60 >60   EGFRNONAA >60 >60   , CBC   Recent Labs   Lab 07/22/19  0543 07/23/19  0551   WBC 4.86 4.64   HGB 7.3* 7.0*   HCT 25.2* 23.5*    229    and INR No results found for: INR, PROTIME  Radiology: CT scan: CT ABDOMEN PELVIS WITH CONTRAST:   Results for orders placed or performed during the hospital encounter of 07/17/19   CT Abdomen Pelvis With Contrast    Narrative    EXAMINATION:  CT ABDOMEN PELVIS WITH CONTRAST    CLINICAL HISTORY:  postop internal hernia repair s/p gastric bypass with abdominal distention, pain and lack of bowel movements;    TECHNIQUE:  Low dose axial images, sagittal and coronal reformations were obtained from the lung bases to the pubic symphysis following the IV administration of 75 mL of Omnipaque 350.  30 mL of oral Omnipaque 350 was administered.    COMPARISON:  07/17/2019    FINDINGS:  Heart: Normal size. No effusion.    Lung Bases: Small bilateral pleural effusions, right greater than left.  Patchy infiltrate right lower lobe.    Liver: Normal size and attenuation. No focal lesions.  Portal vein is patent.  Mild intrahepatic ductal dilatation.  Mild prominence of common bile duct.  Minimal ascites is seen around the liver.    Gallbladder: Surgically absent.    Bile Ducts: Mild intrahepatic  and common bile duct dilatation.  Common bile duct measures 9 mm.    Pancreas: No mass. No peripancreatic fat stranding.    Spleen: Mild splenomegaly with complex cyst along the superolateral margin measuring 2.7 x 2.1 cm.    Adrenals: Normal.    Kidneys/Ureters: Normal enhancement.  Several benign splenic cysts are suspected.  13 mm fatty lesion within the interpolar region left kidney is consistent with an angiomyolipoma.  No mass or  hydroureteronephrosis.    Bladder: No wall thickening.    Reproductive organs: IUD is seen within the uterus.    GI Tract/Mesentery: Postoperative changes are seen within the stomach.  No evidence of bowel obstruction.  Mild mucosal thickening is seen within small bowel loops within the left hemiabdomen and mild prominence within the proximal jejunum likely reflecting postoperative changes and mild ileus.  No obstruction is seen.  Moderate constipation.  Mild mesenteric edema.    Peritoneal Space: Moderate amount of free fluid is seen scattered throughout the pelvis.    Retroperitoneum: No significant adenopathy.    Abdominal wall: Mild body wall edema.  Midline scar is seen.  Air is seen within the anterior abdominal wall thought to be postoperative in nature.    Vasculature: No aneurysm.    Bones: No acute fracture. No suspicious lytic or sclerotic lesions.      Impression    Mild mucosal thickening is seen within small bowel loops within the left hemiabdomen and mild prominence within the proximal jejunum likely reflecting postoperative changes and mild ileus.  No evidence of obstruction or volvulus.    Moderate ascites.    See above for additional findings.    All CT scans at this facility use dose modulation, iterative reconstruction, and/or weight based dosing when appropriate to reduce radiation dose to as low as reasonable achievable.      Electronically signed by: Rodrigo Manuel MD  Date:    07/22/2019  Time:    14:11    and CT ABDOMEN PELVIS WITHOUT CONTRAST: No results  found for this visit on 07/17/19.    Pending Diagnostic Studies:     None        Final Active Diagnoses:    Diagnosis Date Noted POA    PRINCIPAL PROBLEM:  Internal hernia [K45.8] 07/17/2019 Yes    Small bowel volvulus [K56.2] 07/17/2019 Yes      Problems Resolved During this Admission:      Discharged Condition: good    Disposition: Home or Self Care    Follow Up:  Follow-up Information     José Antonio Laurent MD In 2 weeks.    Specialty:  Colon and Rectal Surgery  Contact information:  [address] [place] [address] DR Mike CUENCA [address] [phone]                 Patient Instructions:      Diet Adult Regular     Lifting restrictions   Order Comments: No lifting greater than 10 lb for 6 weeks after surgery     Other restrictions (specify):   Order Comments: Showers only until clinic visit.  No baths or submerging incision underneath water    No driving while on narcotic pain medication or until able to react to other  safely on the road including slamming on breaks     Notify your health care provider if you experience any of the following:  increased confusion or weakness     Notify your health care provider if you experience any of the following:  persistent dizziness, light-headedness, or visual disturbances     Notify your health care provider if you experience any of the following:  worsening rash     Notify your health care provider if you experience any of the following:  severe persistent headache     Notify your health care provider if you experience any of the following:  difficulty breathing or increased cough     Notify your health care provider if you experience any of the following:  redness, tenderness, or signs of infection (pain, swelling, redness, odor or green/yellow discharge around incision site)     Notify your health care provider if you experience any of the following:  severe uncontrolled pain     Notify your health care provider if you experience any of the following:  persistent nausea  and vomiting or diarrhea     Notify your health care provider if you experience any of the following:  temperature >100.4     Activity as tolerated     Medications:  Reconciled Home Medications:      Medication List      START taking these medications    acetaminophen 325 MG tablet  Commonly known as:  TYLENOL  Take 2 tablets (650 mg total) by mouth every 6 (six) hours.     docusate sodium 100 MG capsule  Commonly known as:  COLACE  Take 1 capsule (100 mg total) by mouth 2 (two) times daily.     nozaseptin nasal   Commonly known as:  NOZIN  1 each by Each Nare route 2 (two) times daily.     oxyCODONE 5 MG immediate release tablet  Commonly known as:  ROXICODONE  Take 1 tablet (5 mg total) by mouth every 6 (six) hours as needed.     simethicone 80 MG chewable tablet  Commonly known as:  MYLICON  Take 2 tablets (160 mg total) by mouth 2 hours after meals and at bedtime.        CONTINUE taking these medications    busPIRone 5 MG Tab  Commonly known as:  BUSPAR  Take 5 mg by mouth 2 (two) times daily.     eflornithine 13.9 % cream  Commonly known as:  VANIQA  Apply topically 2 (two) times daily.     naproxen 375 MG tablet  Commonly known as:  NAPROSYN  Take 1 tablet (375 mg total) by mouth 2 (two) times daily with meals.        STOP taking these medications    DESOGEN 0.15-0.03 mg per tablet  Generic drug:  desogestrel-ethinyl estradiol          Time spent on the discharge of patient: 35 minutes    José Antonio Laurent MD  General Surgery  [facility]

## 2019-07-23 NOTE — ASSESSMENT & PLAN NOTE
POD6 s/p exlap, volvulus reduction and internal hernia repair, +flatus, no BM yet postop    - CT scan without obstruction or recurrent volvulus/internal hernia yesterday  - Enema today to see if constipation can be relieved with this  - Cont reg diet as tolerates  - Cont simethicone  - Cont PO pain control  - IVF  - OOB, ambulation encouraged  - IS 10xs/hr while awake

## 2019-07-23 NOTE — SUBJECTIVE & OBJECTIVE
Interval History:  CT scan yesterday without any obstruction or major abnormality.  Continues passing flatus but no bowel movement.  Tolerating diet.    Medications:  Continuous Infusions:   sodium chloride 0.45% 50 mL/hr at 07/22/19 1355     Scheduled Meds:   acetaminophen  650 mg Oral Q6H    docusate sodium  100 mg Oral BID    enoxaparin  40 mg Subcutaneous Daily    famotidine  20 mg Oral BID    nozaseptin   Each Nare BID    simethicone  2 tablet Oral QID (PC + HS)     PRN Meds:diphenhydrAMINE, morphine, naloxone, ondansetron, oxyCODONE, oxyCODONE, promethazine (PHENERGAN) IVPB, sodium chloride 0.9%, sodium chloride 0.9%     Review of patient's allergies indicates:  No Known Allergies  Objective:     Vital Signs (Most Recent):  Temp: 98.3 °F (36.8 °C) (07/23/19 0803)  Pulse: 84 (07/23/19 0803)  Resp: 18 (07/23/19 0803)  BP: 116/75 (07/23/19 0803)  SpO2: 95 % (07/23/19 0803) Vital Signs (24h Range):  Temp:  [97.9 °F (36.6 °C)-99.1 °F (37.3 °C)] 98.3 °F (36.8 °C)  Pulse:  [55-84] 84  Resp:  [18] 18  SpO2:  [95 %-100 %] 95 %  BP: (109-132)/(61-78) 116/75     Weight: 63 kg (138 lb 14.2 oz)  Body mass index is 21.75 kg/m².    Intake/Output - Last 3 Shifts       07/21 0700 - 07/22 0659 07/22 0700 - 07/23 0659 07/23 0700 - 07/24 0659    P.O. 480 1200     I.V. (mL/kg) 1150 (18.3) 650 (10.3)     IV Piggyback 100      Total Intake(mL/kg) 1730 (27.5) 1850 (29.4)     Net +1730 +1850            Urine Occurrence 3 x 6 x           Physical Exam   Constitutional: She is oriented to person, place, and time. She appears well-developed.   HENT:   Head: Normocephalic and atraumatic.   Eyes: Conjunctivae and EOM are normal.   Neck: Normal range of motion. No thyromegaly present.   Cardiovascular: Normal rate and regular rhythm.   Pulmonary/Chest: Effort normal. No respiratory distress.   Abdominal:   Soft, mild distention with mild global tympany, appropriately TTP; incision c/d/i without erythema or drainage; no rebound or  guarding   Musculoskeletal: Normal range of motion. She exhibits no edema or tenderness.   Neurological: She is alert and oriented to person, place, and time.   Skin: Skin is warm and dry. Capillary refill takes less than 2 seconds. No rash noted.   Psychiatric: She has a normal mood and affect.       Significant Labs:  CBC:   Recent Labs   Lab 07/23/19  0551   WBC 4.64   RBC 3.44*   HGB 7.0*   HCT 23.5*      MCV 68*   MCH 20.3*   MCHC 29.8*     BMP:   Recent Labs   Lab 07/23/19  0551   GLU 75      K 4.2      CO2 25   BUN 7   CREATININE 0.6   CALCIUM 8.2*   MG 1.5*       Significant Diagnostics:  CT: I have reviewed all pertinent results/findings within the past 24 hours and my personal findings are:  No obstruction or evidence of recurrent internal hernia or volvulus     FINDINGS:  Heart: Normal size. No effusion.    Lung Bases: Small bilateral pleural effusions, right greater than left.  Patchy infiltrate right lower lobe.    Liver: Normal size and attenuation. No focal lesions.  Portal vein is patent.  Mild intrahepatic ductal dilatation.  Mild prominence of common bile duct.  Minimal ascites is seen around the liver.    Gallbladder: Surgically absent.    Bile Ducts: Mild intrahepatic and common bile duct dilatation.  Common bile duct measures 9 mm.    Pancreas: No mass. No peripancreatic fat stranding.    Spleen: Mild splenomegaly with complex cyst along the superolateral margin measuring 2.7 x 2.1 cm.    Adrenals: Normal.    Kidneys/Ureters: Normal enhancement.  Several benign splenic cysts are suspected.  13 mm fatty lesion within the interpolar region left kidney is consistent with an angiomyolipoma.  No mass or  hydroureteronephrosis.    Bladder: No wall thickening.    Reproductive organs: IUD is seen within the uterus.    GI Tract/Mesentery: Postoperative changes are seen within the stomach.  No evidence of bowel obstruction.  Mild mucosal thickening is seen within small bowel loops  within the left hemiabdomen and mild prominence within the proximal jejunum likely reflecting postoperative changes and mild ileus.  No obstruction is seen.  Moderate constipation.  Mild mesenteric edema.    Peritoneal Space: Moderate amount of free fluid is seen scattered throughout the pelvis.    Retroperitoneum: No significant adenopathy.    Abdominal wall: Mild body wall edema.  Midline scar is seen.  Air is seen within the anterior abdominal wall thought to be postoperative in nature.    Vasculature: No aneurysm.    Bones: No acute fracture. No suspicious lytic or sclerotic lesions.      Impression       Mild mucosal thickening is seen within small bowel loops within the left hemiabdomen and mild prominence within the proximal jejunum likely reflecting postoperative changes and mild ileus.  No evidence of obstruction or volvulus.    Moderate ascites.

## 2019-07-23 NOTE — PLAN OF CARE
Problem: Adult Inpatient Plan of Care  Goal: Plan of Care Review  Outcome: Ongoing (interventions implemented as appropriate)  Remains free from harm, refused pain meds, positions self, encouraged to ambulate, no acute distress noted. 24 hour chart check complete.

## 2019-07-24 NOTE — PLAN OF CARE
07/24/19 0717   Final Note   Assessment Type Final Discharge Note   Anticipated Discharge Disposition Home   Right Care Referral Info   Post Acute Recommendation No Care

## 2019-07-25 ENCOUNTER — PATIENT OUTREACH (OUTPATIENT)
Dept: ADMINISTRATIVE | Facility: CLINIC | Age: 39
End: 2019-07-25

## 2019-07-25 ENCOUNTER — TELEPHONE (OUTPATIENT)
Dept: SURGERY | Facility: CLINIC | Age: 39
End: 2019-07-25

## 2019-07-25 NOTE — PATIENT INSTRUCTIONS
Treating Constipation    Constipation is a common and often uncomfortable problem. Constipation means you have bowel movements fewer than 3 times per week, or strain to pass hard, dry stool. It can last a short time. Or it can be a problem that never seems to go away. The good news is that it can often be treated and controlled.  Eat more fiber  One of the best ways to help treat constipation is to increase your fiber intake. You can do this either through diet or by using fiber supplements. Fiber (in whole grains, fruits, and vegetables) adds bulk and absorbs water to soften the stool. This helps the stool pass through the colon more easily. When you increase your fiber intake, do it slowly to avoid side effects such as bloating. Also increase the amount of water that you drink. Eating more of the following foods can add fiber to your diet.  · High-fiber cereals  · Whole grains, bran, and brown rice  · Vegetables such as carrots, broccoli, and greens  · Fresh fruits (especially apples, pears, and dried fruits like raisins and apricots)  · Nuts and legumes (especially beans such as lentils, kidney beans, and lima beans)  Get physically active  Exercise helps improve the working of your colon which helps ease constipation. Try to get some physical activity every day. If you havent been active for a while, talk to your healthcare provider before starting again.  Laxatives  Your healthcare provider may suggest an over-the-counter product to help ease your constipation. He or she may suggest the use of bulk-forming agents or laxatives. The use of laxatives, if used as directed, is common and safe. Follow directions carefully when using them. See your healthcare provider for new-onset constipation, or long-term constipation, to rule out other causes such as medicines or thyroid disease.  Date Last Reviewed: 7/1/2016  © 3492-1453 [employer]. [address] [place] [address], [address] [place] [address]. All rights reserved.  This information is not intended as a substitute for professional medical care. Always follow your healthcare professional's instructions.

## 2019-07-25 NOTE — TELEPHONE ENCOUNTER
Called pt - Left a voicemail message advising that a Post Op appt has been made with Dr Laurent for Monday August 5th at 1420 located at [place] [facility] on the 1st floor. - Advised pt to call if this date and time needed to be changed or if she has any questions or concerns - Left Department number [phone]    ----- Message from Ada Orozco RN sent at 7/25/2019 10:37 AM CDT -----  Just spoke with Janett De Dios for a post discharge follow-up call.  Patient is needing a 2 week post op appt with Dr Laurent scheduled.  Please call her when you get a moment to schedule this.  Thanks so much.  Ada Orozco RN  [facility] Call Center  [facility]

## 2019-08-05 ENCOUNTER — OFFICE VISIT (OUTPATIENT)
Dept: SURGERY | Facility: CLINIC | Age: 39
End: 2019-08-05
Payer: COMMERCIAL

## 2019-08-05 VITALS
WEIGHT: 139.75 LBS | DIASTOLIC BLOOD PRESSURE: 68 MMHG | SYSTOLIC BLOOD PRESSURE: 110 MMHG | HEART RATE: 64 BPM | TEMPERATURE: 99 F | BODY MASS INDEX: 21.89 KG/M2

## 2019-08-05 DIAGNOSIS — K45.8 INTERNAL HERNIA: Primary | ICD-10-CM

## 2019-08-05 PROCEDURE — 99999 PR PBB SHADOW E&M-EST. PATIENT-LVL III: CPT | Mod: PBBFAC,,, | Performed by: COLON & RECTAL SURGERY

## 2019-08-05 PROCEDURE — 99024 POSTOP FOLLOW-UP VISIT: CPT | Mod: S$GLB,,, | Performed by: COLON & RECTAL SURGERY

## 2019-08-05 PROCEDURE — 99999 PR PBB SHADOW E&M-EST. PATIENT-LVL III: ICD-10-PCS | Mod: PBBFAC,,, | Performed by: COLON & RECTAL SURGERY

## 2019-08-05 PROCEDURE — 99024 PR POST-OP FOLLOW-UP VISIT: ICD-10-PCS | Mod: S$GLB,,, | Performed by: COLON & RECTAL SURGERY

## 2019-08-05 NOTE — PROGRESS NOTES
History & Physical    SUBJECTIVE:     History of Present Illness:  Patient is a 39 y.o. female presents for her 1st postoperative visit following admission in July 2019.  Patient has previously undergone Aristides-en-Y gastric bypass surgery in [facility] around 2013.  She developed an internal hernia with small bowel volvulus and required exploratory laparotomy, internal hernia apparent small-bowel volvulus reduction on July 17, 2019.  She had mostly uneventful postoperative recovery in the hospital although she did have some slow return of bowel function.  Repeat CT scan prior to discharge did show that the ileus internal hernia had been completely fixed and she began to have flatus and bowel movements.  Since discharge, she has done well by having normal bowel movements and flatus.  She does report some tenderness at the incision site as well as some uneven healing of the upper incision. She denies any fever, chills, nausea, vomiting.    7/17/19: Exlap, internal hernia repair, small bowel volvulus reduction    Review of patient's allergies indicates:  No Known Allergies    Current Outpatient Medications   Medication Sig Dispense Refill    acetaminophen (TYLENOL) 325 MG tablet Take 2 tablets (650 mg total) by mouth every 6 (six) hours.  0    busPIRone (BUSPAR) 5 MG Tab Take 5 mg by mouth 2 (two) times daily.      docusate sodium (COLACE) 100 MG capsule Take 1 capsule (100 mg total) by mouth 2 (two) times daily. 60 capsule 0    oxyCODONE (ROXICODONE) 5 MG immediate release tablet Take 1 tablet (5 mg total) by mouth every 6 (six) hours as needed. 20 tablet 0     No current facility-administered medications for this visit.        History reviewed. No pertinent past medical history.  Past Surgical History:   Procedure Laterality Date    CHOLECYSTECTOMY      GASTRIC BYPASS      LAPAROTOMY, EXPLORATORY N/A 7/17/2019    [facility]    REPAIR, HERNIA N/A 7/17/2019    [facility]  [facility]    tonsilectomy      TONSILLECTOMY       Family History   Problem Relation Age of Onset    Breast cancer Paternal Grandfather     Diabetes Maternal Grandmother     Hypertension Maternal Grandmother     Hypertension Father     Breast cancer Sister      Social History     Tobacco Use    Smoking status: Never Smoker   Substance Use Topics    Alcohol use: No    Drug use: No        Review of Systems:  Review of Systems   Constitutional: Negative for activity change, appetite change, chills, fatigue, fever and unexpected weight change.   HENT: Negative for congestion, ear pain, sore throat and trouble swallowing.    Eyes: Negative for pain, redness and itching.   Respiratory: Negative for cough, shortness of breath and wheezing.    Cardiovascular: Negative for chest pain, palpitations and leg swelling.   Gastrointestinal: Positive for abdominal pain and anal bleeding. Negative for abdominal distention, blood in stool, constipation, diarrhea, nausea, rectal pain and vomiting.   Endocrine: Negative for cold intolerance, heat intolerance and polyuria.   Genitourinary: Negative for dysuria, flank pain, frequency and hematuria.   Musculoskeletal: Negative for gait problem, joint swelling and neck pain.   Skin: Negative for color change, rash and wound.   Allergic/Immunologic: Negative for environmental allergies and immunocompromised state.   Neurological: Negative for dizziness, speech difficulty, weakness and numbness.   Psychiatric/Behavioral: Negative for agitation, confusion and hallucinations.       OBJECTIVE:     Vital Signs (Most Recent)  Temp: 98.5 °F (36.9 °C) (08/05/19 1359)  Pulse: 64 (08/05/19 1359)  BP: 110/68 (08/05/19 1359)     63.4 kg (139 lb 12.4 oz)     Physical Exam:  Physical Exam   Constitutional: She is oriented to person, place, and time. She appears well-developed.   HENT:   Head: Normocephalic and atraumatic.   Eyes: Conjunctivae and EOM are normal.    Neck: Normal range of motion. No thyromegaly present.   Cardiovascular: Normal rate and regular rhythm.   Pulmonary/Chest: Effort normal. No respiratory distress.   Abdominal:   Soft, nontender; well-healed midline incision which is c/d/i without hernia; supra-umbilical portion of incision with a small palpable subcutaneous tissue knot consistent with suture which is tender and off-midline healing of incision   Musculoskeletal: Normal range of motion. She exhibits no edema or tenderness.   Neurological: She is alert and oriented to person, place, and time.   Skin: Skin is warm and dry. Capillary refill takes less than 2 seconds. No rash noted.   Psychiatric: She has a normal mood and affect.       Laboratory  Lab Results   Component Value Date    WBC 4.64 07/23/2019    HGB 7.0 (L) 07/23/2019    HCT 23.5 (L) 07/23/2019     07/23/2019    CHOL 238 (H) 06/15/2012    TRIG 340 (H) 06/15/2012    HDL 39 (L) 06/15/2012    ALT 13 07/17/2019    AST 23 07/17/2019     07/23/2019    K 4.2 07/23/2019     07/23/2019    CREATININE 0.6 07/23/2019    BUN 7 07/23/2019    CO2 25 07/23/2019    TSH 0.603 06/15/2012    HGBA1C 5.5 06/15/2012         ASSESSMENT/PLAN:     38yo F with previous gastric bypass in 2013 who required exploratory laparotomy, small bowel volvulus reduction and internal hernia repair on July 17 2019 his recovered well postoperatively but with uneven healing incision on the supraumbilical portion along with a palpable subcutaneous stitch    - No further intervention required at this time for internal hernia/small bowel volvulus  - Referral to plastic surgery eval for uneven healing of supra-umbilical tissue for possible revision once completely healed from this surgery  - RTC PRN    José Antonio Laurent MD  Colon and Rectal Surgery  [facility]

## 2019-08-26 ENCOUNTER — PATIENT MESSAGE (OUTPATIENT)
Dept: ADMINISTRATIVE | Facility: OTHER | Age: 39
End: 2019-08-26

## 2021-05-24 ENCOUNTER — TELEPHONE (OUTPATIENT)
Dept: HEMATOLOGY/ONCOLOGY | Facility: CLINIC | Age: 41
End: 2021-05-24

## 2021-05-26 ENCOUNTER — TELEPHONE (OUTPATIENT)
Dept: HEMATOLOGY/ONCOLOGY | Facility: CLINIC | Age: 41
End: 2021-05-26

## 2021-05-31 ENCOUNTER — TELEPHONE (OUTPATIENT)
Dept: HEMATOLOGY/ONCOLOGY | Facility: CLINIC | Age: 41
End: 2021-05-31

## 2021-06-01 ENCOUNTER — LAB VISIT (OUTPATIENT)
Dept: LAB | Facility: HOSPITAL | Age: 41
End: 2021-06-01
Attending: INTERNAL MEDICINE
Payer: MEDICAID

## 2021-06-01 ENCOUNTER — OFFICE VISIT (OUTPATIENT)
Dept: HEMATOLOGY/ONCOLOGY | Facility: CLINIC | Age: 41
End: 2021-06-01
Payer: MEDICAID

## 2021-06-01 VITALS
WEIGHT: 148 LBS | TEMPERATURE: 97 F | HEART RATE: 73 BPM | OXYGEN SATURATION: 100 % | BODY MASS INDEX: 23.23 KG/M2 | DIASTOLIC BLOOD PRESSURE: 71 MMHG | RESPIRATION RATE: 18 BRPM | SYSTOLIC BLOOD PRESSURE: 119 MMHG | HEIGHT: 67 IN

## 2021-06-01 DIAGNOSIS — Z98.84 S/P GASTRIC BYPASS: ICD-10-CM

## 2021-06-01 DIAGNOSIS — Z80.3 FAMILY HISTORY OF BREAST CANCER: ICD-10-CM

## 2021-06-01 DIAGNOSIS — D50.8 OTHER IRON DEFICIENCY ANEMIAS: ICD-10-CM

## 2021-06-01 DIAGNOSIS — D50.8 OTHER IRON DEFICIENCY ANEMIA: Primary | ICD-10-CM

## 2021-06-01 DIAGNOSIS — D50.8 OTHER IRON DEFICIENCY ANEMIA: ICD-10-CM

## 2021-06-01 DIAGNOSIS — N91.2 AMENORRHEA: ICD-10-CM

## 2021-06-01 PROCEDURE — 83540 ASSAY OF IRON: CPT | Performed by: INTERNAL MEDICINE

## 2021-06-01 PROCEDURE — 82607 VITAMIN B-12: CPT | Performed by: INTERNAL MEDICINE

## 2021-06-01 PROCEDURE — 82728 ASSAY OF FERRITIN: CPT | Performed by: INTERNAL MEDICINE

## 2021-06-01 PROCEDURE — 99205 OFFICE O/P NEW HI 60 MIN: CPT | Mod: S$PBB,,, | Performed by: INTERNAL MEDICINE

## 2021-06-01 PROCEDURE — 36415 COLL VENOUS BLD VENIPUNCTURE: CPT | Performed by: INTERNAL MEDICINE

## 2021-06-01 PROCEDURE — 99999 PR PBB SHADOW E&M-EST. PATIENT-LVL III: ICD-10-PCS | Mod: PBBFAC,,, | Performed by: INTERNAL MEDICINE

## 2021-06-01 PROCEDURE — 99999 PR PBB SHADOW E&M-EST. PATIENT-LVL III: CPT | Mod: PBBFAC,,, | Performed by: INTERNAL MEDICINE

## 2021-06-01 PROCEDURE — 99205 PR OFFICE/OUTPT VISIT, NEW, LEVL V, 60-74 MIN: ICD-10-PCS | Mod: S$PBB,,, | Performed by: INTERNAL MEDICINE

## 2021-06-01 PROCEDURE — 99213 OFFICE O/P EST LOW 20 MIN: CPT | Mod: PBBFAC | Performed by: INTERNAL MEDICINE

## 2021-06-01 RX ORDER — VENLAFAXINE HYDROCHLORIDE 75 MG/1
CAPSULE, EXTENDED RELEASE ORAL 2 TIMES DAILY
COMMUNITY
Start: 2021-05-26

## 2021-06-01 RX ORDER — TRAZODONE HYDROCHLORIDE 100 MG/1
TABLET ORAL
COMMUNITY

## 2021-06-01 RX ORDER — HEPARIN 100 UNIT/ML
500 SYRINGE INTRAVENOUS
Status: CANCELLED | OUTPATIENT
Start: 2021-06-09

## 2021-06-01 RX ORDER — SODIUM CHLORIDE 0.9 % (FLUSH) 0.9 %
10 SYRINGE (ML) INJECTION
Status: CANCELLED | OUTPATIENT
Start: 2021-06-09

## 2021-06-01 RX ORDER — HEPARIN 100 UNIT/ML
500 SYRINGE INTRAVENOUS
Status: CANCELLED | OUTPATIENT
Start: 2021-06-16

## 2021-06-01 RX ORDER — SODIUM CHLORIDE 0.9 % (FLUSH) 0.9 %
10 SYRINGE (ML) INJECTION
Status: CANCELLED | OUTPATIENT
Start: 2021-06-16

## 2021-06-02 DIAGNOSIS — D51.3 OTHER DIETARY VITAMIN B12 DEFICIENCY ANEMIA: ICD-10-CM

## 2021-06-02 LAB
FERRITIN SERPL-MCNC: 3 NG/ML (ref 20–300)
IRON SERPL-MCNC: 13 UG/DL (ref 30–160)
SATURATED IRON: 2 % (ref 20–50)
TOTAL IRON BINDING CAPACITY: 583 UG/DL (ref 250–450)
TRANSFERRIN SERPL-MCNC: 394 MG/DL (ref 200–375)
VIT B12 SERPL-MCNC: <146 PG/ML (ref 210–950)

## 2021-06-02 RX ORDER — SODIUM CHLORIDE 0.9 % (FLUSH) 0.9 %
10 SYRINGE (ML) INJECTION
Status: CANCELLED | OUTPATIENT
Start: 2021-06-09

## 2021-06-02 RX ORDER — HEPARIN 100 UNIT/ML
500 SYRINGE INTRAVENOUS
Status: CANCELLED | OUTPATIENT
Start: 2021-06-09

## 2021-06-02 RX ORDER — CYANOCOBALAMIN 1000 UG/ML
1000 INJECTION, SOLUTION INTRAMUSCULAR; SUBCUTANEOUS
Status: CANCELLED
Start: 2021-06-09

## 2021-06-03 ENCOUNTER — TELEPHONE (OUTPATIENT)
Dept: HEMATOLOGY/ONCOLOGY | Facility: CLINIC | Age: 41
End: 2021-06-03

## 2021-06-03 ENCOUNTER — TELEPHONE (OUTPATIENT)
Dept: OBSTETRICS AND GYNECOLOGY | Facility: CLINIC | Age: 41
End: 2021-06-03

## 2021-06-08 ENCOUNTER — INFUSION (OUTPATIENT)
Dept: INFUSION THERAPY | Facility: HOSPITAL | Age: 41
End: 2021-06-08
Attending: INTERNAL MEDICINE
Payer: MEDICAID

## 2021-06-08 VITALS
DIASTOLIC BLOOD PRESSURE: 63 MMHG | WEIGHT: 147.94 LBS | OXYGEN SATURATION: 99 % | SYSTOLIC BLOOD PRESSURE: 103 MMHG | HEART RATE: 69 BPM | RESPIRATION RATE: 18 BRPM | BODY MASS INDEX: 23.17 KG/M2 | TEMPERATURE: 99 F

## 2021-06-08 DIAGNOSIS — D51.3 OTHER DIETARY VITAMIN B12 DEFICIENCY ANEMIA: Primary | ICD-10-CM

## 2021-06-08 DIAGNOSIS — D50.8 OTHER IRON DEFICIENCY ANEMIAS: ICD-10-CM

## 2021-06-08 PROCEDURE — 63600175 PHARM REV CODE 636 W HCPCS: Mod: JG | Performed by: INTERNAL MEDICINE

## 2021-06-08 PROCEDURE — A4216 STERILE WATER/SALINE, 10 ML: HCPCS | Performed by: INTERNAL MEDICINE

## 2021-06-08 PROCEDURE — 25000003 PHARM REV CODE 250: Performed by: INTERNAL MEDICINE

## 2021-06-08 PROCEDURE — 96372 THER/PROPH/DIAG INJ SC/IM: CPT

## 2021-06-08 PROCEDURE — 63600175 PHARM REV CODE 636 W HCPCS: Performed by: INTERNAL MEDICINE

## 2021-06-08 PROCEDURE — 96365 THER/PROPH/DIAG IV INF INIT: CPT

## 2021-06-08 RX ORDER — HEPARIN 100 UNIT/ML
500 SYRINGE INTRAVENOUS
Status: CANCELLED | OUTPATIENT
Start: 2021-06-08

## 2021-06-08 RX ORDER — SODIUM CHLORIDE 0.9 % (FLUSH) 0.9 %
10 SYRINGE (ML) INJECTION
Status: DISCONTINUED | OUTPATIENT
Start: 2021-06-08 | End: 2021-06-08 | Stop reason: HOSPADM

## 2021-06-08 RX ORDER — CYANOCOBALAMIN 1000 UG/ML
1000 INJECTION, SOLUTION INTRAMUSCULAR; SUBCUTANEOUS
Status: DISCONTINUED | OUTPATIENT
Start: 2021-06-08 | End: 2021-06-08 | Stop reason: HOSPADM

## 2021-06-08 RX ORDER — CYANOCOBALAMIN 1000 UG/ML
1000 INJECTION, SOLUTION INTRAMUSCULAR; SUBCUTANEOUS
Status: CANCELLED
Start: 2021-06-08

## 2021-06-08 RX ORDER — SODIUM CHLORIDE 0.9 % (FLUSH) 0.9 %
10 SYRINGE (ML) INJECTION
Status: CANCELLED | OUTPATIENT
Start: 2021-06-08

## 2021-06-08 RX ADMIN — CYANOCOBALAMIN 1000 MCG: 1000 INJECTION, SOLUTION INTRAMUSCULAR at 02:06

## 2021-06-08 RX ADMIN — FERRIC CARBOXYMALTOSE INJECTION 750 MG: 50 INJECTION, SOLUTION INTRAVENOUS at 02:06

## 2021-06-08 RX ADMIN — Medication 10 ML: at 02:06

## 2021-06-15 ENCOUNTER — INFUSION (OUTPATIENT)
Dept: INFUSION THERAPY | Facility: HOSPITAL | Age: 41
End: 2021-06-15
Payer: MEDICAID

## 2021-06-15 VITALS
SYSTOLIC BLOOD PRESSURE: 109 MMHG | DIASTOLIC BLOOD PRESSURE: 69 MMHG | HEART RATE: 67 BPM | OXYGEN SATURATION: 98 % | TEMPERATURE: 98 F | RESPIRATION RATE: 18 BRPM

## 2021-06-15 DIAGNOSIS — D50.8 OTHER IRON DEFICIENCY ANEMIAS: Primary | ICD-10-CM

## 2021-06-15 PROCEDURE — 63600175 PHARM REV CODE 636 W HCPCS: Mod: JG | Performed by: INTERNAL MEDICINE

## 2021-06-15 PROCEDURE — 96365 THER/PROPH/DIAG IV INF INIT: CPT

## 2021-06-15 PROCEDURE — 25000003 PHARM REV CODE 250: Performed by: INTERNAL MEDICINE

## 2021-06-15 RX ORDER — SODIUM CHLORIDE 0.9 % (FLUSH) 0.9 %
10 SYRINGE (ML) INJECTION
Status: CANCELLED | OUTPATIENT
Start: 2021-06-15

## 2021-06-15 RX ORDER — HEPARIN 100 UNIT/ML
500 SYRINGE INTRAVENOUS
Status: CANCELLED | OUTPATIENT
Start: 2021-06-15

## 2021-06-15 RX ORDER — SODIUM CHLORIDE 0.9 % (FLUSH) 0.9 %
10 SYRINGE (ML) INJECTION
Status: CANCELLED | OUTPATIENT
Start: 2021-06-16

## 2021-06-15 RX ORDER — CYANOCOBALAMIN 1000 UG/ML
1000 INJECTION, SOLUTION INTRAMUSCULAR; SUBCUTANEOUS
Status: CANCELLED
Start: 2021-06-15

## 2021-06-15 RX ORDER — HEPARIN 100 UNIT/ML
500 SYRINGE INTRAVENOUS
Status: CANCELLED | OUTPATIENT
Start: 2021-06-16

## 2021-06-15 RX ADMIN — FERRIC CARBOXYMALTOSE INJECTION 750 MG: 50 INJECTION, SOLUTION INTRAVENOUS at 02:06

## 2021-06-24 ENCOUNTER — TELEPHONE (OUTPATIENT)
Dept: OBSTETRICS AND GYNECOLOGY | Facility: CLINIC | Age: 41
End: 2021-06-24

## 2021-07-26 NOTE — PLAN OF CARE
Problem: Adult Inpatient Plan of Care  Goal: Plan of Care Review  Outcome: Ongoing (interventions implemented as appropriate)  Fall precautions in place. Call light and personal items within reach. Educated patient on side effects of medication administered. Pt verbalized understanding. 24 hour order check done.  Will continue to monitor.         VIEW ALL

## 2021-08-04 ENCOUNTER — TELEPHONE (OUTPATIENT)
Dept: HEMATOLOGY/ONCOLOGY | Facility: CLINIC | Age: 41
End: 2021-08-04

## 2021-08-25 ENCOUNTER — TELEPHONE (OUTPATIENT)
Dept: HEMATOLOGY/ONCOLOGY | Facility: CLINIC | Age: 41
End: 2021-08-25

## 2021-08-25 ENCOUNTER — PATIENT MESSAGE (OUTPATIENT)
Dept: HEMATOLOGY/ONCOLOGY | Facility: CLINIC | Age: 41
End: 2021-08-25

## 2022-03-09 DIAGNOSIS — Z98.84 S/P GASTRIC BYPASS: ICD-10-CM

## 2022-03-09 DIAGNOSIS — D51.3 OTHER DIETARY VITAMIN B12 DEFICIENCY ANEMIA: Primary | ICD-10-CM

## 2022-03-09 DIAGNOSIS — D50.8 OTHER IRON DEFICIENCY ANEMIA: ICD-10-CM

## 2022-04-01 ENCOUNTER — TELEPHONE (OUTPATIENT)
Dept: HEMATOLOGY/ONCOLOGY | Facility: CLINIC | Age: 42
End: 2022-04-01
Payer: COMMERCIAL

## 2022-04-12 ENCOUNTER — PATIENT MESSAGE (OUTPATIENT)
Dept: HEMATOLOGY/ONCOLOGY | Facility: CLINIC | Age: 42
End: 2022-04-12

## 2022-04-12 ENCOUNTER — TELEPHONE (OUTPATIENT)
Dept: HEMATOLOGY/ONCOLOGY | Facility: CLINIC | Age: 42
End: 2022-04-12
Payer: COMMERCIAL

## 2022-12-12 ENCOUNTER — TELEPHONE (OUTPATIENT)
Dept: HEMATOLOGY/ONCOLOGY | Facility: CLINIC | Age: 42
End: 2022-12-12
Payer: COMMERCIAL

## 2023-01-26 ENCOUNTER — TELEPHONE (OUTPATIENT)
Dept: HEMATOLOGY/ONCOLOGY | Facility: CLINIC | Age: 43
End: 2023-01-26
Payer: COMMERCIAL

## 2023-01-26 NOTE — TELEPHONE ENCOUNTER
Left detailed vm regarding pt appt for today that will be cancelled and rescheduled due to her needing labs prior to appt. Advised pt to call our office

## 2023-03-07 ENCOUNTER — TELEPHONE (OUTPATIENT)
Dept: HEMATOLOGY/ONCOLOGY | Facility: CLINIC | Age: 43
End: 2023-03-07
Payer: COMMERCIAL

## 2024-08-21 ENCOUNTER — LAB VISIT (OUTPATIENT)
Dept: LAB | Facility: HOSPITAL | Age: 44
End: 2024-08-21
Attending: STUDENT IN AN ORGANIZED HEALTH CARE EDUCATION/TRAINING PROGRAM
Payer: COMMERCIAL

## 2024-08-21 ENCOUNTER — OFFICE VISIT (OUTPATIENT)
Dept: FAMILY MEDICINE | Facility: CLINIC | Age: 44
End: 2024-08-21
Payer: COMMERCIAL

## 2024-08-21 VITALS
DIASTOLIC BLOOD PRESSURE: 82 MMHG | SYSTOLIC BLOOD PRESSURE: 124 MMHG | HEIGHT: 67 IN | HEART RATE: 87 BPM | TEMPERATURE: 98 F | BODY MASS INDEX: 25.88 KG/M2 | WEIGHT: 164.88 LBS | OXYGEN SATURATION: 99 %

## 2024-08-21 DIAGNOSIS — Z97.5 IUD (INTRAUTERINE DEVICE) IN PLACE: ICD-10-CM

## 2024-08-21 DIAGNOSIS — N92.6 IRREGULAR BLEEDING: ICD-10-CM

## 2024-08-21 DIAGNOSIS — Z12.31 ENCOUNTER FOR SCREENING MAMMOGRAM FOR MALIGNANT NEOPLASM OF BREAST: ICD-10-CM

## 2024-08-21 DIAGNOSIS — Z79.899 ON LONG TERM DRUG THERAPY: ICD-10-CM

## 2024-08-21 DIAGNOSIS — F32.A DEPRESSION, UNSPECIFIED DEPRESSION TYPE: Primary | ICD-10-CM

## 2024-08-21 DIAGNOSIS — F41.9 ANXIETY: ICD-10-CM

## 2024-08-21 DIAGNOSIS — Z98.84 S/P GASTRIC BYPASS: ICD-10-CM

## 2024-08-21 DIAGNOSIS — R41.840 ATTENTION DEFICIT: ICD-10-CM

## 2024-08-21 DIAGNOSIS — D50.8 OTHER IRON DEFICIENCY ANEMIAS: ICD-10-CM

## 2024-08-21 DIAGNOSIS — R04.0 EPISTAXIS: ICD-10-CM

## 2024-08-21 LAB
ALBUMIN SERPL BCP-MCNC: 4.2 G/DL (ref 3.5–5.2)
ALP SERPL-CCNC: 104 U/L (ref 55–135)
ALT SERPL W/O P-5'-P-CCNC: 32 U/L (ref 10–44)
ANION GAP SERPL CALC-SCNC: 8 MMOL/L (ref 8–16)
AST SERPL-CCNC: 31 U/L (ref 10–40)
BASOPHILS # BLD AUTO: 0.04 K/UL (ref 0–0.2)
BASOPHILS NFR BLD: 0.5 % (ref 0–1.9)
BILIRUB SERPL-MCNC: 0.5 MG/DL (ref 0.1–1)
BUN SERPL-MCNC: 10 MG/DL (ref 6–20)
CALCIUM SERPL-MCNC: 9.8 MG/DL (ref 8.7–10.5)
CHLORIDE SERPL-SCNC: 105 MMOL/L (ref 95–110)
CHOLEST SERPL-MCNC: 202 MG/DL (ref 120–199)
CHOLEST/HDLC SERPL: 3.9 {RATIO} (ref 2–5)
CO2 SERPL-SCNC: 25 MMOL/L (ref 23–29)
CREAT SERPL-MCNC: 0.8 MG/DL (ref 0.5–1.4)
DIFFERENTIAL METHOD BLD: ABNORMAL
EOSINOPHIL # BLD AUTO: 0.2 K/UL (ref 0–0.5)
EOSINOPHIL NFR BLD: 2.3 % (ref 0–8)
ERYTHROCYTE [DISTWIDTH] IN BLOOD BY AUTOMATED COUNT: 14.3 % (ref 11.5–14.5)
EST. GFR  (NO RACE VARIABLE): >60 ML/MIN/1.73 M^2
ESTIMATED AVG GLUCOSE: 103 MG/DL (ref 68–131)
FSH SERPL-ACNC: 115.03 MIU/ML
GLUCOSE SERPL-MCNC: 86 MG/DL (ref 70–110)
HBA1C MFR BLD: 5.2 % (ref 4–5.6)
HCT VFR BLD AUTO: 40.6 % (ref 37–48.5)
HDLC SERPL-MCNC: 52 MG/DL (ref 40–75)
HDLC SERPL: 25.7 % (ref 20–50)
HGB BLD-MCNC: 12.1 G/DL (ref 12–16)
IMM GRANULOCYTES # BLD AUTO: 0.02 K/UL (ref 0–0.04)
IMM GRANULOCYTES NFR BLD AUTO: 0.2 % (ref 0–0.5)
LDLC SERPL CALC-MCNC: 121 MG/DL (ref 63–159)
LH SERPL-ACNC: 42.3 MIU/ML
LYMPHOCYTES # BLD AUTO: 2.4 K/UL (ref 1–4.8)
LYMPHOCYTES NFR BLD: 29.8 % (ref 18–48)
MCH RBC QN AUTO: 26.2 PG (ref 27–31)
MCHC RBC AUTO-ENTMCNC: 29.8 G/DL (ref 32–36)
MCV RBC AUTO: 88 FL (ref 82–98)
MONOCYTES # BLD AUTO: 0.5 K/UL (ref 0.3–1)
MONOCYTES NFR BLD: 5.8 % (ref 4–15)
NEUTROPHILS # BLD AUTO: 5 K/UL (ref 1.8–7.7)
NEUTROPHILS NFR BLD: 61.4 % (ref 38–73)
NONHDLC SERPL-MCNC: 150 MG/DL
NRBC BLD-RTO: 0 /100 WBC
PLATELET # BLD AUTO: 332 K/UL (ref 150–450)
PMV BLD AUTO: 10.2 FL (ref 9.2–12.9)
POTASSIUM SERPL-SCNC: 4.1 MMOL/L (ref 3.5–5.1)
PROGEST SERPL-MCNC: 0.2 NG/ML
PROT SERPL-MCNC: 7.5 G/DL (ref 6–8.4)
RBC # BLD AUTO: 4.61 M/UL (ref 4–5.4)
SODIUM SERPL-SCNC: 138 MMOL/L (ref 136–145)
TESTOST SERPL-MCNC: 26 NG/DL (ref 5–73)
TRIGL SERPL-MCNC: 145 MG/DL (ref 30–150)
TSH SERPL DL<=0.005 MIU/L-ACNC: 0.76 UIU/ML (ref 0.4–4)
WBC # BLD AUTO: 8.11 K/UL (ref 3.9–12.7)

## 2024-08-21 PROCEDURE — 80053 COMPREHEN METABOLIC PANEL: CPT | Performed by: STUDENT IN AN ORGANIZED HEALTH CARE EDUCATION/TRAINING PROGRAM

## 2024-08-21 PROCEDURE — 3008F BODY MASS INDEX DOCD: CPT | Mod: CPTII,S$GLB,, | Performed by: STUDENT IN AN ORGANIZED HEALTH CARE EDUCATION/TRAINING PROGRAM

## 2024-08-21 PROCEDURE — 83001 ASSAY OF GONADOTROPIN (FSH): CPT | Performed by: STUDENT IN AN ORGANIZED HEALTH CARE EDUCATION/TRAINING PROGRAM

## 2024-08-21 PROCEDURE — 80061 LIPID PANEL: CPT | Performed by: STUDENT IN AN ORGANIZED HEALTH CARE EDUCATION/TRAINING PROGRAM

## 2024-08-21 PROCEDURE — 84403 ASSAY OF TOTAL TESTOSTERONE: CPT | Performed by: STUDENT IN AN ORGANIZED HEALTH CARE EDUCATION/TRAINING PROGRAM

## 2024-08-21 PROCEDURE — 83002 ASSAY OF GONADOTROPIN (LH): CPT | Performed by: STUDENT IN AN ORGANIZED HEALTH CARE EDUCATION/TRAINING PROGRAM

## 2024-08-21 PROCEDURE — 3079F DIAST BP 80-89 MM HG: CPT | Mod: CPTII,S$GLB,, | Performed by: STUDENT IN AN ORGANIZED HEALTH CARE EDUCATION/TRAINING PROGRAM

## 2024-08-21 PROCEDURE — 99999 PR PBB SHADOW E&M-EST. PATIENT-LVL V: CPT | Mod: PBBFAC,,, | Performed by: STUDENT IN AN ORGANIZED HEALTH CARE EDUCATION/TRAINING PROGRAM

## 2024-08-21 PROCEDURE — 84144 ASSAY OF PROGESTERONE: CPT | Performed by: STUDENT IN AN ORGANIZED HEALTH CARE EDUCATION/TRAINING PROGRAM

## 2024-08-21 PROCEDURE — 82679 ASSAY OF ESTRONE: CPT | Performed by: STUDENT IN AN ORGANIZED HEALTH CARE EDUCATION/TRAINING PROGRAM

## 2024-08-21 PROCEDURE — 85025 COMPLETE CBC W/AUTO DIFF WBC: CPT | Performed by: STUDENT IN AN ORGANIZED HEALTH CARE EDUCATION/TRAINING PROGRAM

## 2024-08-21 PROCEDURE — 99204 OFFICE O/P NEW MOD 45 MIN: CPT | Mod: S$GLB,,, | Performed by: STUDENT IN AN ORGANIZED HEALTH CARE EDUCATION/TRAINING PROGRAM

## 2024-08-21 PROCEDURE — 1159F MED LIST DOCD IN RCRD: CPT | Mod: CPTII,S$GLB,, | Performed by: STUDENT IN AN ORGANIZED HEALTH CARE EDUCATION/TRAINING PROGRAM

## 2024-08-21 PROCEDURE — 3074F SYST BP LT 130 MM HG: CPT | Mod: CPTII,S$GLB,, | Performed by: STUDENT IN AN ORGANIZED HEALTH CARE EDUCATION/TRAINING PROGRAM

## 2024-08-21 PROCEDURE — 82670 ASSAY OF TOTAL ESTRADIOL: CPT | Performed by: STUDENT IN AN ORGANIZED HEALTH CARE EDUCATION/TRAINING PROGRAM

## 2024-08-21 PROCEDURE — 84443 ASSAY THYROID STIM HORMONE: CPT | Performed by: STUDENT IN AN ORGANIZED HEALTH CARE EDUCATION/TRAINING PROGRAM

## 2024-08-21 PROCEDURE — 83036 HEMOGLOBIN GLYCOSYLATED A1C: CPT | Performed by: STUDENT IN AN ORGANIZED HEALTH CARE EDUCATION/TRAINING PROGRAM

## 2024-08-21 RX ORDER — HYDROXYZINE HYDROCHLORIDE 25 MG/1
25 TABLET, FILM COATED ORAL 3 TIMES DAILY PRN
Qty: 90 TABLET | Refills: 3 | Status: SHIPPED | OUTPATIENT
Start: 2024-08-21 | End: 2024-12-19

## 2024-08-21 RX ORDER — DEXTROAMPHETAMINE SACCHARATE, AMPHETAMINE ASPARTATE, DEXTROAMPHETAMINE SULFATE AND AMPHETAMINE SULFATE 5; 5; 5; 5 MG/1; MG/1; MG/1; MG/1
1 TABLET ORAL 2 TIMES DAILY
COMMUNITY
Start: 2024-05-08

## 2024-08-21 RX ORDER — FLUOXETINE HYDROCHLORIDE 20 MG/1
20 CAPSULE ORAL DAILY
Qty: 30 CAPSULE | Refills: 0 | Status: SHIPPED | OUTPATIENT
Start: 2024-08-21 | End: 2024-09-20
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
## 2024-08-21 NOTE — PATIENT INSTRUCTIONS
ADD Testing and Counseling Services     NEUROMEDICAL CENTER  Dr. Princess Jacob  [phone]  [email or web address]      [facility]   Https://[email or web address]  Jared Norman , PhD  Matt Welsh, PhD     [address] [facility] [place] [address]  [place] [address] [phone]  [email or web address]     [facility]  Phone: [phone]  Facsimile: [phone]  Main Clinic & Mailing Address:  [address] [place] [address] [address]  [place], [place] [address]    Satellite Clinic:  [address] [place] [address] [address]  [place], [place] [address]    Outreach Sites:  Riri Woodard Gonzales, [place], and Gelacio Lopez    [email or web address]

## 2024-08-22 LAB — ESTRADIOL SERPL-MCNC: 20 PG/ML

## 2024-08-25 LAB — ESTRONE SERPL-MCNC: 34 PG/ML

## 2024-09-04 ENCOUNTER — OFFICE VISIT (OUTPATIENT)
Dept: FAMILY MEDICINE | Facility: CLINIC | Age: 44
End: 2024-09-04
Payer: COMMERCIAL

## 2024-09-04 DIAGNOSIS — Z97.5 IUD (INTRAUTERINE DEVICE) IN PLACE: ICD-10-CM

## 2024-09-04 DIAGNOSIS — D50.8 OTHER IRON DEFICIENCY ANEMIAS: ICD-10-CM

## 2024-09-04 DIAGNOSIS — F32.A DEPRESSION, UNSPECIFIED DEPRESSION TYPE: Primary | ICD-10-CM

## 2024-09-04 PROCEDURE — 99214 OFFICE O/P EST MOD 30 MIN: CPT | Mod: 95,,, | Performed by: STUDENT IN AN ORGANIZED HEALTH CARE EDUCATION/TRAINING PROGRAM

## 2024-09-04 PROCEDURE — 3044F HG A1C LEVEL LT 7.0%: CPT | Mod: CPTII,95,, | Performed by: STUDENT IN AN ORGANIZED HEALTH CARE EDUCATION/TRAINING PROGRAM

## 2024-09-04 RX ORDER — FLUOXETINE HYDROCHLORIDE 20 MG/1
20 CAPSULE ORAL DAILY
Qty: 30 CAPSULE | Refills: 2 | Status: SHIPPED | OUTPATIENT
Start: 2024-09-04 | End: 2024-12-03

## 2024-09-04 NOTE — PROGRESS NOTES
"The patient location is: home  The chief complaint leading to consultation is: anxiety follow up    Visit type: audiovisual    Face to Face time with patient: 5  32 minutes of total time spent on the encounter, which includes face to face time and non-face to face time preparing to see the patient (eg, review of tests), Obtaining and/or reviewing separately obtained history, Documenting clinical information in the electronic or other health record, Independently interpreting results (not separately reported) and communicating results to the patient/family/caregiver, or Care coordination (not separately reported).         Each patient to whom he or she provides medical services by telemedicine is:  (1) informed of the relationship between the physician and patient and the respective role of any other health care provider with respect to management of the patient; and (2) notified that he or she may decline to receive medical services by telemedicine and may withdraw from such care at any time.    Notes:   Patient ID: Janett Anderson is a 44 y.o. female.    Chief Complaint: No chief complaint on file.    History of Present Illness    CHIEF COMPLAINT:  Ms. Stephon anderson presents today for follow up.    ANXIETY AND DEPRESSION MANAGEMENT:  She reports significant improvement with Prozac 20 mg daily, noting a substantial decrease in panic attacks. She expresses satisfaction with the treatment, stating she has noticed a "huge difference." She denies any sleep disturbances related to the medication. She reports occasional use (2-3 times) of an additional anxiety medication at night for racing thoughts and chest tightness. She sleeps well and denies using trazodone for sleep.    GYNECOLOGICAL CONCERNS:  Her hormone levels indicate a postmenopausal pattern despite her age of 44. Laboratory results show low estradiol and high FSH levels. She has not undergone a hysterectomy. She has an upcoming OB/GYN appointment " "in September to discuss these findings and bleeding issues.    MEDICAL HISTORY:  She has a history of iron deficiency and gastric bypass surgery. Her current hemoglobin level is 12.1, significantly improved from five years ago when it was 7 during a period of iron deficiency. She denies any current symptoms of anemia.    MEDICATIONS AND SUPPLEMENTS:  She takes an iron supplement specifically designed for gastric bypass patients, referred to as "gastric melts". She is adherent to her iron supplementation regimen post-gastric bypass.    UPCOMING APPOINTMENTS:  She has an ENT appointment scheduled for tomorrow to address nosebleed concerns.    ROS:  Constitutional: -fatigue  Psychiatric: -sleep difficulty         Pmh, Psh, Family Hx, Social Hx updated in Epic Tabs today.   No problems updated.      Review of Systems    Physical exam: Deferred     Assessment:     1. Depression, unspecified depression type    2. Other iron deficiency anemias    3. IUD (intrauterine device) in place        LABS:   Lab Results   Component Value Date    HGBA1C 5.2 08/21/2024    HGBA1C 5.5 06/15/2012    HGBA1C 5.4 09/13/2011      Lab Results   Component Value Date    CHOL 202 (H) 08/21/2024    CHOL 238 (H) 06/15/2012    CHOL 207 (H) 09/13/2011     Lab Results   Component Value Date    LDLCALC 121.0 08/21/2024    LDLCALC 131.0 06/15/2012    LDLCALC 122.6 09/13/2011     Lab Results   Component Value Date    WBC 8.11 08/21/2024    HGB 12.1 08/21/2024    HCT 40.6 08/21/2024     08/21/2024    CHOL 202 (H) 08/21/2024    TRIG 145 08/21/2024    HDL 52 08/21/2024    ALT 32 08/21/2024    AST 31 08/21/2024     08/21/2024    K 4.1 08/21/2024     08/21/2024    CREATININE 0.8 08/21/2024    BUN 10 08/21/2024    CO2 25 08/21/2024    TSH 0.764 08/21/2024    HGBA1C 5.2 08/21/2024       Plan:   Diagnoses and all orders for this visit:    Depression, unspecified depression type  -     FLUoxetine 20 MG capsule; Take 1 capsule (20 mg total) by " mouth once daily.    Other iron deficiency anemias  -     IRON AND TIBC; Future  -     FERRITIN; Future    IUD (intrauterine device) in place        Assessment & Plan    R04.0 Epistaxis  F41.1 Generalized anxiety disorder  F41.0 Panic disorder [episodic paroxysmal anxiety]  N95.1 Menopausal and female climacteric states  D50.9 Iron deficiency anemia, unspecified  F32.9 Major depressive disorder, single episode, unspecified\    DEPRESSION:  - Assessed patient's response to recently initiated Prozac 20mg, noting significant improvement in symptoms.  - Continued Prozac 20mg daily.  ANXIETY:  - Evaluated effectiveness of as-needed medication for anxiety, finding it beneficial for acute episodes.  - Continued as-needed anxiety medication for acute episodes when mind racing causes chest tightness.  IRON DEFICIENCY:  - Considered patient's history of gastric bypass and potential impact on iron levels.  - Reviewed CBC results, noting hemoglobin improvement from previous iron deficiency.  - Will order iron panel for comprehensive assessment, despite normal hemoglobin, due to gastric bypass history.  - Ms. Stephon anderson to continue taking iron supplement for gastric bypass patients.  - Ordered iron panel blood test.  POSTMENOPAUSAL SYMPTOMS:  - Noted patient's hormone levels suggestive of postmenopausal state, despite patient's age of 44; plan to consult with OB/GYN for further evaluation.  FOLLOW UP:  - Follow up in 3 months.           Face to Face time with patient:  8:00 AM CDT -      Each patient to whom he or she provides medical services by telemedicine is:  (1) informed of the relationship between the physician and patient and the respective role of any other health care provider with respect to management of the patient; and (2) notified that he or she may decline to receive medical services by telemedicine and may withdraw from such care at any time.    I spent a total of     32  minutes face to face and non-face to  face on the date of this visit.This includes time preparing to see the patient (eg, review of tests, notes), obtaining and/or reviewing additional history from an independent historian and/or outside medical records, documenting clinical information in the electronic health record, independently interpreting results and/or communicating results to the patient/family/caregiver, or care coordinator.  Visit today included increased complexity associated with the care of the episodic problem addressed and managing the longitudinal care of the patient due to the serious and/or complex managed problem(s).    There are no Patient Instructions on file for this visit.    No follow-ups on file.  The 10-year ASCVD risk score (Daniel OWENS, et al., 2019) is: 0.8%    Values used to calculate the score:      Age: 44 years      Sex: Female      Is Non- : No      Diabetic: No      Tobacco smoker: No      Systolic Blood Pressure: 124 mmHg      Is BP treated: No      HDL Cholesterol: 52 mg/dL      Total Cholesterol: 202 mg/dL    This note was generated with the assistance of ambient listening technology. Verbal consent was obtained by the patient and accompanying visitor(s) for the recording of patient appointment to facilitate this note. I attest to having reviewed and edited the generated note for accuracy, though some syntax or spelling errors may persist. Please contact the author of this note for any clarification.

## 2024-09-12 ENCOUNTER — PATIENT MESSAGE (OUTPATIENT)
Dept: FAMILY MEDICINE | Facility: CLINIC | Age: 44
End: 2024-09-12
Payer: COMMERCIAL

## 2024-11-22 ENCOUNTER — OFFICE VISIT (OUTPATIENT)
Dept: OBSTETRICS AND GYNECOLOGY | Facility: CLINIC | Age: 44
End: 2024-11-22
Payer: COMMERCIAL

## 2024-11-22 VITALS
WEIGHT: 172.31 LBS | DIASTOLIC BLOOD PRESSURE: 84 MMHG | BODY MASS INDEX: 27.04 KG/M2 | HEIGHT: 67 IN | SYSTOLIC BLOOD PRESSURE: 132 MMHG

## 2024-11-22 DIAGNOSIS — Z01.419 ENCOUNTER FOR GYNECOLOGICAL EXAMINATION WITHOUT ABNORMAL FINDING: Primary | ICD-10-CM

## 2024-11-22 DIAGNOSIS — Z97.5 BREAKTHROUGH BLEEDING ASSOCIATED WITH INTRAUTERINE DEVICE (IUD): ICD-10-CM

## 2024-11-22 DIAGNOSIS — Z12.31 ENCOUNTER FOR SCREENING MAMMOGRAM FOR BREAST CANCER: ICD-10-CM

## 2024-11-22 DIAGNOSIS — N92.1 BREAKTHROUGH BLEEDING ASSOCIATED WITH INTRAUTERINE DEVICE (IUD): ICD-10-CM

## 2024-11-22 DIAGNOSIS — R14.0 BLOATING: ICD-10-CM

## 2024-11-22 DIAGNOSIS — Z30.431 IUD CHECK UP: ICD-10-CM

## 2024-11-22 DIAGNOSIS — R63.5 WEIGHT GAIN: ICD-10-CM

## 2024-11-22 PROCEDURE — 99999 PR PBB SHADOW E&M-EST. PATIENT-LVL III: CPT | Mod: PBBFAC,,, | Performed by: NURSE PRACTITIONER

## 2024-11-22 NOTE — PROGRESS NOTES
CC: Well woman exam    Janett Murrell is a 44 y.o. female  presents for well woman exam.  LMP: No LMP recorded. Patient has had an implant..      Pt also complaints today of:    Weight gain feels due to her hormones   Bloating  Believes IUD was placed in   Last pap about 5 years ago      Health Maintenance Topics with due status: Not Due       Topic Last Completion Date    Hemoglobin A1c (Diabetic Prevention Screening) 2024    RSV Vaccine (Age 60+ and Pregnant patients) Not Due     Past Medical History:   Diagnosis Date    ADHD (attention deficit hyperactivity disorder)     Anemia     Anxiety     Depression     PTSD (post-traumatic stress disorder)      Past Surgical History:   Procedure Laterality Date    CHOLECYSTECTOMY      GASTRIC BYPASS      HERNIA REPAIR N/A 2019    Procedure: REPAIR, HERNIA;  Surgeon: José Antonio Laurent MD;  Location: [facility];  Service: General;  Laterality: N/A;  Internal hernia repair    tonsilectomy      TONSILLECTOMY       Social History     Socioeconomic History    Marital status:    Tobacco Use    Smoking status: Never   Substance and Sexual Activity    Alcohol use: No    Drug use: No    Sexual activity: Yes     Social Drivers of Health     Financial Resource Strain: Low Risk  (2024)    Overall Financial Resource Strain (CARDIA)     Difficulty of Paying Living Expenses: Not hard at all   Food Insecurity: No Food Insecurity (2024)    Hunger Vital Sign     Worried About Running Out of Food in the Last Year: Never true     Ran Out of Food in the Last Year: Never true   Physical Activity: Insufficiently Active (2024)    Exercise Vital Sign     Days of Exercise per Week: 1 day     Minutes of Exercise per Session: 60 min   Stress: Stress Concern Present (2024)    [employer] [place] [employer] - Occupational Stress Questionnaire     Feeling of Stress : To some extent   Housing Stability: Unknown (2024)    Housing  "Stability Vital Sign     Unable to Pay for Housing in the Last Year: No     Family History   Problem Relation Name Age of Onset    Breast cancer Paternal Grandfather      Diabetes Maternal Grandmother      Hypertension Maternal Grandmother      Hypertension Father      Breast cancer Sister       OB History          2    Para        Term                AB        Living   2         SAB        IAB        Ectopic        Multiple        Live Births                     /84 (BP Location: Right arm, Patient Position: Sitting)   Ht 5' 7" (1.702 m)   Wt 78.1 kg (172 lb 4.6 oz)   BMI 26.98 kg/m²       ROS:  Per hpi    PHYSICAL EXAM:  APPEARANCE: Well nourished, well developed, in no acute distress.  AFFECT: WNL, alert and oriented x 3  SKIN: No acne or hirsutism  NECK: Neck symmetric without masses or thyromegaly  NODES: No inguinal, cervical, axillary, or femoral lymph node enlargement  CHEST: Good respiratory effect  ABDOMEN: Soft.  No tenderness or masses.  No hepatosplenomegaly.  No hernias.  BREASTS: Symmetrical, no skin changes or visible lesions.  No palpable masses, nipple discharge bilaterally.  PELVIC: Normal external genitalia without lesions.  Normal hair distribution.  Adequate perineal body, normal urethral meatus.  Vagina moist and well rugated without lesions or discharge.  Cervix pink, without lesions, discharge or tenderness - IUD strings noted.  No significant cystocele or rectocele.  Bimanual exam shows uterus to be normal size, regular, mobile and nontender.  Adnexa without masses or tenderness.    EXTREMITIES: No edema.  Physical Exam    1. Encounter for gynecological examination without abnormal finding  Liquid-Based Pap Smear, Screening    HPV High Risk Genotypes, PCR      2. IUD check up        3. Weight gain        4. Bloating  US Pelvis Comp with Transvag NON-OB (xpd      5. Breakthrough bleeding associated with intrauterine device (IUD)  US Pelvis Comp with Transvag NON-OB " (xpd      6. Encounter for screening mammogram for breast cancer  Mammo Digital Screening Bilat w/ Sean       AND PLAN:    Janett was seen today for well woman.    Diagnoses and all orders for this visit:    Encounter for gynecological examination without abnormal finding  -     Liquid-Based Pap Smear, Screening  -     HPV High Risk Genotypes, PCR    IUD check up    Weight gain    Bloating  -     US Pelvis Comp with Transvag NON-OB (xpd; Future    Breakthrough bleeding associated with intrauterine device (IUD)  -     US Pelvis Comp with Transvag NON-OB (xpd; Future    Encounter for screening mammogram for breast cancer  -     Mammo Digital Screening Bilat w/ Sean; Future     Once u/s done and resulted will have pt return for IUD removal and address low dose ocp for hrt use     Patient was counseled today on A.C.S. Pap guidelines and recommendations for yearly pelvic exams, mammograms and monthly self breast exams; to see her PCP for other health maintenance.

## 2024-12-09 DIAGNOSIS — F32.A DEPRESSION, UNSPECIFIED DEPRESSION TYPE: ICD-10-CM

## 2024-12-09 RX ORDER — FLUOXETINE HYDROCHLORIDE 20 MG/1
20 CAPSULE ORAL DAILY
Qty: 30 CAPSULE | Refills: 2 | Status: SHIPPED | OUTPATIENT
Start: 2024-12-09 | End: 2025-03-09

## 2024-12-09 NOTE — TELEPHONE ENCOUNTER
No care due was identified.  Health [facility] Embedded Care Due Messages. Reference number: [id number].   12/09/2024 2:24:24 PM CST

## 2025-01-19 ENCOUNTER — PATIENT MESSAGE (OUTPATIENT)
Dept: OBSTETRICS AND GYNECOLOGY | Facility: CLINIC | Age: 45
End: 2025-01-19
Payer: COMMERCIAL

## 2025-01-29 DIAGNOSIS — F32.A DEPRESSION, UNSPECIFIED DEPRESSION TYPE: ICD-10-CM

## 2025-01-29 NOTE — TELEPHONE ENCOUNTER
No care due was identified.  Health [facility] Embedded Care Due Messages. Reference number: [id number].   1/29/2025 9:13:20 AM CST

## 2025-01-30 RX ORDER — FLUOXETINE HYDROCHLORIDE 20 MG/1
20 CAPSULE ORAL DAILY
Qty: 30 CAPSULE | Refills: 2 | Status: SHIPPED | OUTPATIENT
Start: 2025-01-30 | End: 2025-04-30

## 2025-02-01 NOTE — TELEPHONE ENCOUNTER
----- Message from Alanis Boucher PA-C sent at 4/1/2022  2:57 PM CDT -----  She was supposed to get labs today at 12 but she didn't. I am seeing her on Tuesday. Can we reschedule her labs. I need those back before she can be seen.     
I rescheduled this patient labs on Monday the day before her scheduled appointment. Also called to confirm this appointment but didn't get an answer.  
None known

## 2025-06-03 ENCOUNTER — OFFICE VISIT (OUTPATIENT)
Dept: FAMILY MEDICINE | Facility: CLINIC | Age: 45
End: 2025-06-03
Payer: COMMERCIAL

## 2025-06-03 DIAGNOSIS — E66.3 OVERWEIGHT: Primary | ICD-10-CM

## 2025-06-03 DIAGNOSIS — J30.9 ALLERGIC CONJUNCTIVITIS AND RHINITIS, UNSPECIFIED LATERALITY: ICD-10-CM

## 2025-06-03 DIAGNOSIS — H10.10 ALLERGIC CONJUNCTIVITIS AND RHINITIS, UNSPECIFIED LATERALITY: ICD-10-CM

## 2025-06-03 RX ORDER — OLOPATADINE HYDROCHLORIDE 2 MG/ML
1 SOLUTION OPHTHALMIC DAILY
Qty: 5 ML | Refills: 0 | Status: SHIPPED | OUTPATIENT
Start: 2025-06-03 | End: 2025-06-10

## 2025-06-03 RX ORDER — TIRZEPATIDE 2.5 MG/.5ML
1 INJECTION, SOLUTION SUBCUTANEOUS
Qty: 2 ML | Refills: 0 | Status: SHIPPED | OUTPATIENT
Start: 2025-06-03

## 2025-06-03 RX ORDER — LEVOCETIRIZINE DIHYDROCHLORIDE 5 MG/1
5 TABLET, FILM COATED ORAL NIGHTLY
Qty: 30 TABLET | Refills: 11 | Status: SHIPPED | OUTPATIENT
Start: 2025-06-03 | End: 2026-06-03

## (undated) DEVICE — SUT VICRYL 3-0 27 SH

## (undated) DEVICE — Device

## (undated) DEVICE — SEE MEDLINE ITEM 152622

## (undated) DEVICE — GLOVE SURG BIOGEL LATEX SZ 7.5

## (undated) DEVICE — SUT SILK 3-0 STRANDS 30IN

## (undated) DEVICE — PAD ABD 8X10 STERILE

## (undated) DEVICE — SUT SILK 2-0 STRANDS 30IN

## (undated) DEVICE — COVER OVERHEAD SURG LT BLUE

## (undated) DEVICE — SUT SILK 3-0 SH DETACH 30IN

## (undated) DEVICE — SUT VICRYL 3-0 27 CT-1

## (undated) DEVICE — SUT PDS II 1 TP-1 VIL

## (undated) DEVICE — SUT 1 48IN PDS II VIO MONO

## (undated) DEVICE — SUT 2/0 30IN SILK BLK BRAI

## (undated) DEVICE — SEE MEDLINE ITEM 157148

## (undated) DEVICE — SUT CTD VICRYL 2-0 UND BR

## (undated) DEVICE — NDL SPINAL 20GX3.5 HUB

## (undated) DEVICE — SEE MEDLINE ITEM 152739

## (undated) DEVICE — SEE MEDLINE ITEM 157181

## (undated) DEVICE — SEE MEDLINE ITEM 146345

## (undated) DEVICE — SEE MEDLINE ITEM 157117

## (undated) DEVICE — SUT VICRYL 0 SH

## (undated) DEVICE — SUT MONOCRYL 4.0 PS2 CP496G

## (undated) DEVICE — TIP SUC SIGMOIDOSCOPE 18F 12IN

## (undated) DEVICE — SUT SILK 3-0 SH 18IN BLACK

## (undated) DEVICE — ELECTRODE REM PLYHSV RETURN 9

## (undated) DEVICE — SUT MONOCYRL 4-0 PS2 UND

## (undated) DEVICE — SUT VICRYL 2-0 36 CT-1

## (undated) DEVICE — MANIFOLD 4 PORT

## (undated) DEVICE — ADHESIVE DERMABOND ADVANCED

## (undated) DEVICE — SUT 1 36IN PDS II VIO MONO

## (undated) DEVICE — SOL NS 1000CC

## (undated) DEVICE — SUT SILK 0 SUTUPAK SA86H

## (undated) DEVICE — PACK DRAPE PERI/GYN TIBURON

## (undated) DEVICE — SEE MEDLINE ITEM 157027

## (undated) DEVICE — GAUZE SPONGE 4X4 12PLY

## (undated) DEVICE — SPONGE LAP 18X18 PREWASHED

## (undated) DEVICE — SUT VICRYL BR 1 GEN 27 CT-1

## (undated) DEVICE — APPLICATOR CHLORAPREP ORN 26ML

## (undated) DEVICE — ELECTRODE BLD EXT 6.50 ST DISP